# Patient Record
Sex: FEMALE | Race: BLACK OR AFRICAN AMERICAN | Employment: OTHER | URBAN - METROPOLITAN AREA
[De-identification: names, ages, dates, MRNs, and addresses within clinical notes are randomized per-mention and may not be internally consistent; named-entity substitution may affect disease eponyms.]

---

## 2017-02-15 ENCOUNTER — DOCUMENTATION ONLY (OUTPATIENT)
Dept: INTERNAL MEDICINE CLINIC | Age: 62
End: 2017-02-15

## 2017-02-15 NOTE — PROGRESS NOTES
PSR called pts home and cell and left voice messages for pt to call office to schedule f/u appt from pts last ov

## 2017-04-12 ENCOUNTER — OFFICE VISIT (OUTPATIENT)
Dept: CARDIOLOGY CLINIC | Age: 62
End: 2017-04-12

## 2017-04-12 VITALS
BODY MASS INDEX: 26.82 KG/M2 | WEIGHT: 161 LBS | SYSTOLIC BLOOD PRESSURE: 127 MMHG | OXYGEN SATURATION: 97 % | HEART RATE: 80 BPM | HEIGHT: 65 IN | DIASTOLIC BLOOD PRESSURE: 57 MMHG

## 2017-04-12 DIAGNOSIS — M25.471 ANKLE EDEMA, BILATERAL: Primary | ICD-10-CM

## 2017-04-12 DIAGNOSIS — M25.472 ANKLE EDEMA, BILATERAL: Primary | ICD-10-CM

## 2017-04-12 DIAGNOSIS — I10 ESSENTIAL HYPERTENSION: ICD-10-CM

## 2017-04-12 DIAGNOSIS — Z98.890 S/P RADIOFREQUENCY ABLATION OPERATION FOR ARRHYTHMIA: ICD-10-CM

## 2017-04-12 DIAGNOSIS — E78.2 MIXED DYSLIPIDEMIA: ICD-10-CM

## 2017-04-12 DIAGNOSIS — Z86.79 S/P RADIOFREQUENCY ABLATION OPERATION FOR ARRHYTHMIA: ICD-10-CM

## 2017-04-12 DIAGNOSIS — G35 MS (MULTIPLE SCLEROSIS) (HCC): ICD-10-CM

## 2017-04-12 NOTE — PROGRESS NOTES
[facility]   [address] [facility], [address] [place] [address]                                          NEW PATIENT HPI/FOLLOW-UP      NAME:  Maria Eugenia Sutton   :   1955   MRN:   [id number]   PCP:  Elif Leger MD           Subjective: The patient is a 57y.o. year old female  who returns for a routine follow-up after long hiatus for progressive ankle edema over last few months. Seems to occur as day progresses. Denies change in exercise tolerance, chest pain, medication intolerance, palpitations, shortness of breath, PND/orthopnea wheezing, sputum, syncope, dizziness or light headedness. Doing satisfactorily. Review of Systems  General: Pt denies excessive weight gain or loss. Pt is able to conduct ADL's. Respiratory: Denies shortness of breath, GALLARDO, wheezing or stridor.   Cardiovascular: Denies precordial pain, palpitations, +edema or PND  Gastrointestinal: Denies poor appetite, indigestion, abdominal pain or blood in stool  Peripheral vascular: Denies claudication, leg cramps  Neuropsychiatric: Denies paresthesias,tingling,numbness,anxiety,depression,fatigue  Musculoskeletal: Denies pain,tenderness, soreness,swelling      Past Medical History:   Diagnosis Date    Arrhythmia     Essential hypertension     Hypertension     MS (multiple sclerosis) ([address]     Musculoskeletal disorder     Other ill-defined conditions     multiple sclerosis    Unspecified adverse effect of anesthesia     given versed and lasted 24 hours     Patient Active Problem List    Diagnosis Date Noted    Abdominal wall mass of right lower quadrant 2013    HTN (hypertension) 2011    Colon cancer ([address] 2011    MS (multiple sclerosis) ([address] 2011    Fibroids 2011      Past Surgical History:   Procedure Laterality Date    CARDIAC SURG PROCEDURE UNLIST      coronary ablations    COLONOSCOPY  2011         HX COLECTOMY  2006    villous adenoma    HX DILATION AND CURETTAGE  HX HERNIA REPAIR      left    HX HYSTERECTOMY  2011    HX OTHER SURGICAL      tendonitis repair    HX OTHER SURGICAL      cardiac ablation    HX OTHER SURGICAL      bilateral cataracts surgery    HX OTHER SURGICAL      left eye muscle surgery     No Known Allergies   Family History   Problem Relation Age of Onset    Heart Disease Father     Diabetes Sister     Hypertension Sister     Diabetes Mother     Hypertension Mother     Elevated Lipids Mother       Social History     Social History    Marital status: SINGLE     Spouse name: N/A    Number of children: N/A    Years of education: N/A     Occupational History    Not on file. Social History Main Topics    Smoking status: Never Smoker    Smokeless tobacco: Never Used    Alcohol use Yes      Comment: rarely    Drug use: Yes     Special: Prescription, OTC    Sexual activity: Not on file     Other Topics Concern    Not on file     Social History Narrative      Current Outpatient Prescriptions   Medication Sig    amLODIPine (NORVASC) 5 mg tablet TAKE 1 TABLET BY MOUTH DAILY.  Dalfampridine (AMPYRA) 10 mg Tb12 Take 10 mg by mouth two (2) times a day.  cholecalciferol, vitamin D3, (VITAMIN D3) 2,000 unit tab Take  by mouth.  teriflunomide (AUBAGIO) 14 mg tab Take  by mouth daily.  baclofen (LIORESAL) 10 mg tablet Take 10 mg by mouth two (2) times a day.  hydrochlorothiazide (HYDRODIURIL) 25 mg tablet TAKE 1 TABLET EVERY DAY    estradiol (ESTRADIOL TRANSDERMAL PATCH) 0.05 mg/24 hr 1 Patch by TransDERmal route Every Saturday.  prednisoLONE acetate (PRED FORTE) 1 % ophthalmic suspension Administer 1 Drop to both eyes three (3) days a week. Indications: SEVERE OCULAR INFLAMMATION     No current facility-administered medications for this visit. I have reviewed the nurses notes, vitals, problem list, allergy list, medical history, family medical, social history and medications.         Objective:     Physical Exam: Vitals:    04/12/17 1039   BP: 127/57   Pulse: 80   SpO2: 97%   Weight: 161 lb (73 kg)   Height: 5' 5\" (1.651 m)    Body mass index is 26.79 kg/(m^2). General: Well developed, in no acute distress. HEENT: No carotid bruits, no JVD, trach is midline. Heart:  Normal S1/S2 negative S3 or S4. Regular, no murmur, gallop or rub.   Respiratory: Clear bilaterally, no wheezing or rales  Abdomen:   Soft, non-tender, bowel sounds are active.   Extremities:  ++ L>R ankle edema, normal cap refill, no cyanosis. Neuro: A&Ox3, speech clear, gait stable. Skin: Skin color is normal. No rashes or lesions. No diaphoresis. Vascular: 2+ pulses symmetric in all extremities        Data Review:       Cardiographics:    EKG: NSR, incomplete RBBB, LAE,APC's.     Cardiology Labs:    Results for orders placed or performed during the hospital encounter of 01/10/11   EKG, 12 LEAD, INITIAL   Result Value Ref Range    Ventricular Rate 61 BPM    Atrial Rate 61 BPM    P-R Interval 160 ms    QRS Duration 74 ms    Q-T Interval 434 ms    QTC Calculation (Bezet) 436 ms    Calculated P Axis 67 degrees    Calculated R Axis 14 degrees    Calculated T Axis 52 degrees    Diagnosis       Normal sinus rhythm  Possible Left atrial enlargement  When compared with ECG of 08-JUN-2010 15:46,  No significant change was found  Confirmed by Real Time Tomography ([id number]) on 1/11/2011 9:54:50 AM       Lab Results   Component Value Date/Time    Cholesterol, total 223 12/07/2015 09:20 AM    HDL Cholesterol 75 12/07/2015 09:20 AM    LDL, calculated 132 12/07/2015 09:20 AM    Triglyceride 81 12/07/2015 09:20 AM       Lab Results   Component Value Date/Time    Sodium 142 12/07/2015 09:20 AM    Potassium 3.5 12/07/2015 09:20 AM    Chloride 99 12/07/2015 09:20 AM    CO2 30 12/07/2015 09:20 AM    Anion gap 6 01/10/2011 04:35 PM    Glucose 90 12/07/2015 09:20 AM    BUN 15 12/07/2015 09:20 AM    Creatinine 0.75 12/07/2015 09:20 AM    BUN/Creatinine ratio 20 12/07/2015 09:20 AM    GFR est AA 99 12/07/2015 09:20 AM    GFR est non-AA 86 12/07/2015 09:20 AM    Calcium 9.8 12/07/2015 09:20 AM    Bilirubin, total 0.4 03/22/2012 12:00 AM    AST (SGOT) 18 03/22/2012 12:00 AM    Alk. phosphatase 65 03/22/2012 12:00 AM    Protein, total 7.2 03/22/2012 12:00 AM    Albumin 3.7 03/22/2012 12:00 AM    A-G Ratio 1.1 03/22/2012 12:00 AM    ALT (SGPT) 11 03/22/2012 12:00 AM          Assessment:       ICD-10-CM ICD-9-CM    1. Ankle edema, bilateral--L>R M25.471 719.07 AMB POC EKG ROUTINE W/ 12 LEADS, INTER & REP    M25.472     2. Essential hypertension I10 401.9 AMB POC EKG ROUTINE W/ 12 LEADS, INTER & REP   3. MS (multiple sclerosis) (HCC) G35 340    4. S/P radiofrequency ablation operation for arrhythmia--7/16/16  Z98.890 V45.89 AMB POC EKG ROUTINE W/ 12 LEADS, INTER & REP    Z86.79     5. Mixed dyslipidemia E78.2 272.2          Discussion: Patient presents at this time concerned about progressive ankle edema. No evidence of CHF--right sided. Suspect related to amlodipine. Will reduce to 2.5 mg every day--may need to stop and add ARB and continue HCTZ 25 mg every day with option to switch to chlorthalidone. Call with response in 1-2 weeks. F/U in 1 month. Consider echo at f/u. No recurrent palpitations since RFA of arrhythmia. Plan: 1. Continue same meds. Lipid profile and labs followed by PCP. 2.Encouraged to exercise to tolerance and follow low fat, low cholesterol, low sodium predominantly Plant-based (consider Mediterranean) diet. Call with questions or concerns. Will follow up any test results by phone and/or f/u here in office if needed. Jamila Yoo 3.Follow up: 1 month    I have discussed the diagnosis with the patient and the intended plan as seen in the above orders. The patient has received an after-visit summary and questions were answered concerning future plans. I have discussed any concerning medication side effects and warnings with the patient as well.     Storm Sportsdonny, MD  4/12/2017

## 2017-04-12 NOTE — MR AVS SNAPSHOT
Visit Information Date & Time Provider Department Dept. Phone Encounter #  
 4/12/2017 10:30 AM Starlene Paget, MD [facility] at [address] [place] [address] Avenue [address] [place] [address] Upcoming Health Maintenance Date Due Hepatitis C Screening 1955 Pneumococcal 19-64 Highest Risk (1 of 3 - PCV13) 12/4/1973 DTaP/Tdap/Td series (1 - Tdap) 12/4/1975 PAP AKA CERVICAL CYTOLOGY 4/22/2017 BREAST CANCER SCRN MAMMOGRAM 7/19/2017 COLONOSCOPY 4/10/2025 Allergies as of 4/12/2017  Review Complete On: 9/29/2016 By: Mercedez Castellanos MD  
 No Known Allergies Current Immunizations  Never Reviewed Name Date HEP A/HEP B Combined Vaccine 10/24/2011, 4/15/2011 Influenza Vaccine Elane Asya) 9/29/2016 Influenza Vaccine Split 4/15/2011 Not reviewed this visit You Were Diagnosed With   
  
 Codes Comments Essential hypertension    -  Primary ICD-10-CM: I10 
ICD-9-CM: 401.9 Vitals BP Pulse Height(growth percentile) Weight(growth percentile) SpO2 BMI  
 127/57 80 5' 5\" (1.651 m) 161 lb (73 kg) 97% 26.79 kg/m2 OB Status Smoking Status Hysterectomy Never Smoker Vitals History BMI and BSA Data Body Mass Index Body Surface Area  
 26.79 kg/m 2 1.83 m 2 Preferred Pharmacy Pharmacy Name Phone [id number] [place] [facility] [address] [place] [phone] Your Updated Medication List  
  
   
This list is accurate as of: 4/12/17 11:26 AM.  Always use your most recent med list. amLODIPine 5 mg tablet Commonly known as:  Ralf Milly TAKE 1 TABLET BY MOUTH DAILY. AMPYRA 10 mg Tb12 Generic drug:  Dalfampridine Take 10 mg by mouth two (2) times a day. AUBAGIO 14 mg Tab Generic drug:  teriflunomide Take  by mouth daily. baclofen 10 mg tablet Commonly known as:  LIORESAL Take 10 mg by mouth two (2) times a day. ESTRADIOL TRANSDERMAL PATCH 0.05 mg/24 hr  
Generic drug:  estradiol 1 Patch by TransDERmal route Every Saturday. hydroCHLOROthiazide 25 mg tablet Commonly known as:  HYDRODIURIL  
TAKE 1 TABLET EVERY DAY  
  
 PRED FORTE 1 % ophthalmic suspension Generic drug:  prednisoLONE acetate Administer 1 Drop to both eyes three (3) days a week. Indications: SEVERE OCULAR INFLAMMATION  
  
 VITAMIN D3 2,000 unit Tab Generic drug:  cholecalciferol (vitamin D3) Take  by mouth. We Performed the Following AMB POC EKG ROUTINE W/ 12 LEADS, INTER & REP [26975 CPT(R)] Introducing [facility]! [employer] introduces ProPlan patient portal. Now you can access parts of your medical record, email your doctor's office, and request medication refills online. 1. In your internet browser, go to [email or web address] 2. Click on the First Time User? Click Here link in the Sign In box. You will see the New Member Sign Up page. 3. Enter your ProPlan Access Code exactly as it appears below. You will not need to use this code after youve completed the sign-up process. If you do not sign up before the expiration date, you must request a new code. · ProPlan Access Code: [email or web address] Expires: 7/11/2017 11:26 AM 
 
4. Enter the last four digits of your Social Security Number (xxxx) and Date of Birth (mm/dd/yyyy) as indicated and click Submit. You will be taken to the next sign-up page. 5. Create a [employer]t ID. This will be your ProPlan login ID and cannot be changed, so think of one that is secure and easy to remember. 6. Create a ProPlan password. You can change your password at any time. 7. Enter your Password Reset Question and Answer. This can be used at a later time if you forget your password. 8. Enter your e-mail address. You will receive e-mail notification when new information is available in [address] 9. Click Sign Up. You can now view and download portions of your medical record. 10. Click the Download Summary menu link to download a portable copy of your medical information. If you have questions, please visit the Frequently Asked Questions section of the [employer] website. Remember, [employer] is NOT to be used for urgent needs. For medical emergencies, dial 911. Now available from your iPhone and Android! Please provide this summary of care documentation to your next provider. Your primary care clinician is listed as Aissatou AREVALO. If you have any questions after today's visit, please call [phone].

## 2017-04-13 PROBLEM — Z86.79 S/P RADIOFREQUENCY ABLATION OPERATION FOR ARRHYTHMIA: Status: ACTIVE | Noted: 2017-04-13

## 2017-04-13 PROBLEM — Z98.890 S/P RADIOFREQUENCY ABLATION OPERATION FOR ARRHYTHMIA: Status: ACTIVE | Noted: 2017-04-13

## 2017-04-13 PROBLEM — M25.471 ANKLE EDEMA, BILATERAL: Status: ACTIVE | Noted: 2017-04-13

## 2017-04-13 PROBLEM — M25.472 ANKLE EDEMA, BILATERAL: Status: ACTIVE | Noted: 2017-04-13

## 2017-04-13 PROBLEM — E78.2 MIXED DYSLIPIDEMIA: Status: ACTIVE | Noted: 2017-04-13

## 2017-05-05 RX ORDER — HYDROCHLOROTHIAZIDE 25 MG/1
TABLET ORAL
Qty: 90 TAB | Refills: 3 | Status: SHIPPED | OUTPATIENT
Start: 2017-05-05 | End: 2018-02-03

## 2017-10-18 ENCOUNTER — OFFICE VISIT (OUTPATIENT)
Dept: CARDIOLOGY CLINIC | Age: 62
End: 2017-10-18

## 2017-10-18 VITALS
TEMPERATURE: 97.4 F | HEIGHT: 65 IN | SYSTOLIC BLOOD PRESSURE: 124 MMHG | WEIGHT: 157.6 LBS | DIASTOLIC BLOOD PRESSURE: 68 MMHG | BODY MASS INDEX: 26.26 KG/M2 | RESPIRATION RATE: 16 BRPM | OXYGEN SATURATION: 94 % | HEART RATE: 86 BPM

## 2017-10-18 DIAGNOSIS — Z86.79 S/P RADIOFREQUENCY ABLATION OPERATION FOR ARRHYTHMIA: ICD-10-CM

## 2017-10-18 DIAGNOSIS — G35 MS (MULTIPLE SCLEROSIS) (HCC): ICD-10-CM

## 2017-10-18 DIAGNOSIS — M25.471 ANKLE EDEMA, BILATERAL: ICD-10-CM

## 2017-10-18 DIAGNOSIS — C18.9 MALIGNANT NEOPLASM OF COLON, UNSPECIFIED PART OF COLON (HCC): ICD-10-CM

## 2017-10-18 DIAGNOSIS — E78.2 MIXED DYSLIPIDEMIA: ICD-10-CM

## 2017-10-18 DIAGNOSIS — I10 ESSENTIAL HYPERTENSION: Primary | ICD-10-CM

## 2017-10-18 DIAGNOSIS — M25.472 ANKLE EDEMA, BILATERAL: ICD-10-CM

## 2017-10-18 DIAGNOSIS — Z98.890 S/P RADIOFREQUENCY ABLATION OPERATION FOR ARRHYTHMIA: ICD-10-CM

## 2017-10-18 RX ORDER — AMLODIPINE BESYLATE 5 MG/1
2.5 TABLET ORAL DAILY
Qty: 90 TAB | Refills: 3 | Status: SHIPPED | OUTPATIENT
Start: 2017-10-18 | End: 2019-06-17 | Stop reason: SDUPTHER

## 2017-10-18 NOTE — PROGRESS NOTES
[facility]   [address] [facility] [address] [facility] [address]                                          NEW PATIENT HPI/FOLLOW-UP      NAME:  Jose Vaughn   :   1955   MRN:   [id number]   PCP:  Jarvis Stevenson MD           Subjective: The patient is a 57y.o. year old female h/o MS, colon cancer, fibroids, HTN, mixed dyslipidemia, ankle edema (L>R) and s/p RF ablation for arrhythmia who returns for a routine follow-up. Since the last visit, patient reports no new symptoms. Still c/o bilateral ankle swelling. Worsens throughout the day, resolves by morning after resting with feet up. Denies change in exercise tolerance, chest pain, edema, medication intolerance, palpitations, shortness of breath, PND/orthopnea wheezing, sputum, syncope, dizziness or light headedness. Doing satisfactorily. Review of Systems  General: Pt denies excessive weight gain or loss. Pt is able to conduct ADL's. Respiratory: Denies shortness of breath, GALLARDO, wheezing or stridor.   Cardiovascular: +edema Denies precordial pain, palpitations, or PND  Gastrointestinal: Denies poor appetite, indigestion, abdominal pain or blood in stool  Peripheral vascular: Denies claudication, leg cramps  Neuropsychiatric: Denies paresthesias,tingling,numbness,anxiety,depression,fatigue  Musculoskeletal: Denies pain,tenderness, soreness,swelling      Past Medical History:   Diagnosis Date    Arrhythmia     Essential hypertension     Hypertension     MS ([facility]) ([facility]     Musculoskeletal disorder     Other ill-defined conditions(799.89)     multiple sclerosis    Unspecified adverse effect of anesthesia     given versed and lasted 24 hours     Patient Active Problem List    Diagnosis Date Noted    Ankle edema, bilateral--L>R 2017    S/P radiofrequency ablation operation for arrhythmia--2017    Mixed dyslipidemia 2017    Abdominal wall mass of right lower quadrant 2013  HTN (hypertension) 08/29/2011    Colon cancer [address] 08/29/2011    MS (multiple sclerosis) ([facility] 08/29/2011    Fibroids 01/18/2011      Past Surgical History:   Procedure Laterality Date    CARDIAC SURG PROCEDURE UNLIST      coronary ablations    COLONOSCOPY  4/18/2011         HX COLECTOMY  2006    villous adenoma    HX DILATION AND CURETTAGE      HX HERNIA REPAIR      left    HX HYSTERECTOMY  2011    HX OTHER SURGICAL      tendonitis repair    HX OTHER SURGICAL      cardiac ablation    HX OTHER SURGICAL      bilateral cataracts surgery    HX OTHER SURGICAL      left eye muscle surgery     No Known Allergies   Family History   Problem Relation Age of Onset    Heart Disease Father     Diabetes Sister     Hypertension Sister     Diabetes Mother     Hypertension Mother     Elevated Lipids Mother       Social History     Social History    Marital status: SINGLE     Spouse name: N/A    Number of children: N/A    Years of education: N/A     Occupational History    Not on file. Social History Main Topics    Smoking status: Never Smoker    Smokeless tobacco: Never Used    Alcohol use Yes      Comment: rarely    Drug use: Yes     Special: Prescription, OTC    Sexual activity: Not on file     Other Topics Concern    Not on file     Social History Narrative      Current Outpatient Prescriptions   Medication Sig    amLODIPine (NORVASC) 5 mg tablet TAKE 1 TABLET BY MOUTH DAILY.  Dalfampridine (AMPYRA) 10 mg Tb12 Take 10 mg by mouth two (2) times a day.  cholecalciferol, vitamin D3, (VITAMIN D3) 2,000 unit tab Take  by mouth.  baclofen (LIORESAL) 10 mg tablet Take 10 mg by mouth two (2) times a day.  hydrochlorothiazide (HYDRODIURIL) 25 mg tablet TAKE 1 TABLET EVERY DAY    estradiol (ESTRADIOL TRANSDERMAL PATCH) 0.05 mg/24 hr 1 Patch by TransDERmal route Every Saturday.     prednisoLONE acetate (PRED FORTE) 1 % ophthalmic suspension Administer 1 Drop to both eyes three (3) days a week. Indications: SEVERE OCULAR INFLAMMATION    hydroCHLOROthiazide (HYDRODIURIL) 25 mg tablet TAKE 1 TAB BY MOUTH DAILY.  teriflunomide (AUBAGIO) 14 mg tab Take  by mouth daily. No current facility-administered medications for this visit. I have reviewed the nurses notes, vitals, problem list, allergy list, medical history, family medical, social history and medications. Objective:     Physical Exam:     Vitals:    10/18/17 1035   BP: 124/68   Pulse: 86   Resp: 16   Temp: 97.4 °F (36.3 °C)   TempSrc: Oral   SpO2: 94%   Weight: 157 lb 9.6 oz (71.5 kg)   Height: 5' 5\" (1.651 m)    Body mass index is 26.23 kg/(m^2). General: WDWN adult female, in no acute distress. Pleasant affect. HEENT: No carotid bruits, no JVD, trach is midline. Heart:  Normal S1/S2 negative S3 or S4. Regular, no murmur, gallop or rub.   Respiratory: Clear bilaterally, no wheezing or rales  Abdomen:   Soft, non-tender, bowel sounds are active.   Extremities: Very minimal ankle edema L>R, normal cap refill, no cyanosis. Neuro: A&Ox3, speech clear, gait stable. Skin: Skin color is normal. No rashes or lesions. No diaphoresis. Vascular: 2+ pulses symmetric in all extremities        Data Review:       Cardiographics:    EKG: NSR, normal CO, normal axis. Poor R wave progression.     Cardiology Labs:    Results for orders placed or performed during the hospital encounter of 01/10/11   EKG, 12 LEAD, INITIAL   Result Value Ref Range    Ventricular Rate 61 BPM    Atrial Rate 61 BPM    P-R Interval 160 ms    QRS Duration 74 ms    Q-T Interval 434 ms    QTC Calculation (Bezet) 436 ms    Calculated P Axis 67 degrees    Calculated R Axis 14 degrees    Calculated T Axis 52 degrees    Diagnosis       Normal sinus rhythm  Possible Left atrial enlargement  When compared with ECG of 08-JUN-2010 15:46,  No significant change was found  Confirmed by Susie Cadena ([id number]) on 1/11/2011 9:54:50 AM       Lab Results   Component Value Date/Time    Cholesterol, total 223 12/07/2015 09:20 AM    HDL Cholesterol 75 12/07/2015 09:20 AM    LDL, calculated 132 12/07/2015 09:20 AM    Triglyceride 81 12/07/2015 09:20 AM       Lab Results   Component Value Date/Time    Sodium 142 12/07/2015 09:20 AM    Potassium 3.5 12/07/2015 09:20 AM    Chloride 99 12/07/2015 09:20 AM    CO2 30 12/07/2015 09:20 AM    Anion gap 6 01/10/2011 04:35 PM    Glucose 90 12/07/2015 09:20 AM    BUN 15 12/07/2015 09:20 AM    Creatinine 0.75 12/07/2015 09:20 AM    BUN/Creatinine ratio 20 12/07/2015 09:20 AM    GFR est AA 99 12/07/2015 09:20 AM    GFR est non-AA 86 12/07/2015 09:20 AM    Calcium 9.8 12/07/2015 09:20 AM    Bilirubin, total 0.4 03/22/2012 12:00 AM    AST (SGOT) 18 03/22/2012 12:00 AM    Alk. phosphatase 65 03/22/2012 12:00 AM    Protein, total 7.2 03/22/2012 12:00 AM    Albumin 3.7 03/22/2012 12:00 AM    A-G Ratio 1.1 03/22/2012 12:00 AM    ALT (SGPT) 11 03/22/2012 12:00 AM          Assessment:       ICD-10-CM ICD-9-CM    1. Essential hypertension I10 401.9 AMB POC EKG ROUTINE W/ 12 LEADS, INTER & REP   2. Mixed dyslipidemia E78.2 272.2    3. Ankle edema, bilateral--L>R M25.471 719.07     M25.472     4. S/P radiofrequency ablation operation for arrhythmia--7/16/16  Z98.890 V45.89     Z86.79     5. MS (multiple sclerosis) ([facility] G35 340    6. Malignant neoplasm of colon, unspecified part of [facility] ([facility] C18.9 153.9          Discussion: Patient presents at this time stable from a cardiac perspective. Ankle swelling is minimal and worse in left ankle than right. Is back on 5 mg Amlodipine, likely culprit. But BP well controlled. Reduce Amlodipine to 2.5 mg. Advise compression stockings to help with fluid build-up over course of the day. If this worsens, may need to stop amlodipine in favor of ARB. No signs of CHF. Pleased with present status. Plan: 1. Reduce Amlodipine to 2.5 mg daily    -- Add compression stockings for ankle swelling.  Resistant to wearing. 2.Encouraged to exercise to tolerance, lose weight,and follow low fat, low cholesterol, low sodium predominantly Plant-based (consider Mediterranean) diet. 3.Follow up: 12 months    I have discussed the diagnosis with the patient and the intended plan as seen in the above orders. The patient has received an after-visit summary and questions were answered concerning future plans. I have discussed any concerning medication side effects and warnings with the patient as well. Patient seen and examined. All pertinent data reviewed. I have reviewed detailed note as outlined by Kelly Madden. Case discussed with Nursing/medical assistant staff and Kelly Madden. Patient cardiac stable. Trace ankle edema venous exacerbated by Amlodipine. No evidence of cardiac decompensation. Plans as outlined.       Aleksander Biswas PA-C  10/18/2017     Sangeetha Bar MD,FACC

## 2017-10-18 NOTE — MR AVS SNAPSHOT
Visit Information Date & Time Provider Department Dept. Phone Encounter #  
 10/18/2017 10:30 AM Dunia Macdonald MD [facility] at [facility] 1 [id number] Upcoming Health Maintenance Date Due Hepatitis C Screening 1955 Pneumococcal 19-64 Highest Risk (1 of 3 - PCV13) 12/4/1973 DTaP/Tdap/Td series (1 - Tdap) 12/4/1975 PAP AKA CERVICAL CYTOLOGY 4/22/2017 BREAST CANCER SCRN MAMMOGRAM 7/19/2017 INFLUENZA AGE 9 TO ADULT 8/1/2017 COLONOSCOPY 4/10/2025 Allergies as of 10/18/2017  Review Complete On: 03/37/6644 By: Antonio Mckeon RN No Known Allergies Current Immunizations  Never Reviewed Name Date HEP A/HEP B Combined Vaccine 10/24/2011, 4/15/2011 Influenza Vaccine Terrie Goody) 9/29/2016 Influenza Vaccine Split 4/15/2011 Not reviewed this visit You Were Diagnosed With   
  
 Codes Comments Essential hypertension    -  Primary ICD-10-CM: I10 
ICD-9-CM: 401.9 Mixed dyslipidemia     ICD-10-CM: E78.2 ICD-9-CM: 272.2 Ankle edema, bilateral     ICD-10-CM: M25.471, M25.472 ICD-9-CM: 719.07 S/P radiofrequency ablation operation for arrhythmia     ICD-10-CM: Z98.890, Z86.79 
ICD-9-CM: V45.89   
 MS (multiple sclerosis) ([facility]     ICD-10-CM: G35 
ICD-9-CM: 598 Malignant neoplasm of colon, unspecified part of colon ([facility]     ICD-10-CM: C18.9 ICD-9-CM: 153.9 Vitals BP Pulse Temp Resp Height(growth percentile) Weight(growth percentile) 124/68 (BP 1 Location: Right arm, BP Patient Position: Sitting) 86 97.4 °F (36.3 °C) (Oral) 16 5' 5\" (1.651 m) 157 lb 9.6 oz (71.5 kg) SpO2 BMI OB Status Smoking Status 94% 26.23 kg/m2 Hysterectomy Never Smoker BMI and BSA Data Body Mass Index Body Surface Area  
 26.23 kg/m 2 1.81 m 2 Preferred Pharmacy Pharmacy Name Phone [id number] [place], [address] [place] [facility] [phone] Your Updated Medication List  
  
   
This list is accurate as of: 10/18/17 11:10 AM.  Always use your most recent med list. amLODIPine 5 mg tablet Commonly known as:  Miriam Simmonser Take 0.5 Tabs by mouth daily. Indications: hypertension AMPYRA 10 mg Tb12 Generic drug:  Dalfampridine Take 10 mg by mouth two (2) times a day. AUBAGIO 14 mg Tab Generic drug:  teriflunomide Take  by mouth daily. baclofen 10 mg tablet Commonly known as:  LIORESAL Take 10 mg by mouth two (2) times a day. ESTRADIOL TRANSDERMAL PATCH 0.05 mg/24 hr  
Generic drug:  estradiol 1 Patch by TransDERmal route Every Saturday. * hydroCHLOROthiazide 25 mg tablet Commonly known as:  HYDRODIURIL  
TAKE 1 TABLET EVERY DAY  
  
 * hydroCHLOROthiazide 25 mg tablet Commonly known as:  HYDRODIURIL  
TAKE 1 TAB BY MOUTH DAILY. PRED FORTE 1 % ophthalmic suspension Generic drug:  prednisoLONE acetate Administer 1 Drop to both eyes three (3) days a week. Indications: SEVERE OCULAR INFLAMMATION  
  
 VITAMIN D3 2,000 unit Tab Generic drug:  cholecalciferol (vitamin D3) Take  by mouth. * Notice: This list has 2 medication(s) that are the same as other medications prescribed for you. Read the directions carefully, and ask your doctor or other care provider to review them with you. Prescriptions Sent to Pharmacy Refills  
 amLODIPine (NORVASC) 5 mg tablet 3 Sig: Take 0.5 Tabs by mouth daily. Indications: hypertension Class: Normal  
 Pharmacy: [facility]/pharmacy [address] [place] [address] #: [phone] Route: Oral  
  
We Performed the Following AMB POC EKG ROUTINE W/ 12 LEADS, INTER & REP [10865 CPT(R)] Introducing [facility] & HEALTH SERVICES! Dear Mary Jefferson: 
Thank you for requesting a vpod.tv account. Our records indicate that you already have an active [email or web address] account.   You can access your account anytime at [email or web address]. [email or web address]/[email or web address] Guarani Did you know that you can access your hospital and ER discharge instructions at any time in Stackops? You can also review all of your test results from your hospital stay or ER visit. Additional Information If you have questions, please visit the Frequently Asked Questions section of the Stackops website at [email or web address] Remember, Stackops is NOT to be used for urgent needs. For medical emergencies, dial 911. Now available from your iPhone and Android! Please provide this summary of care documentation to your next provider. Your primary care clinician is listed as Sedrick AREVALO. If you have any questions after today's visit, please call [phone].

## 2017-11-17 RX ORDER — AMLODIPINE BESYLATE 5 MG/1
TABLET ORAL
Qty: 90 TAB | Refills: 3 | Status: SHIPPED | OUTPATIENT
Start: 2017-11-17 | End: 2018-02-03

## 2018-02-03 ENCOUNTER — HOSPITAL ENCOUNTER (OUTPATIENT)
Dept: LAB | Age: 63
Discharge: HOME | End: 2018-02-03

## 2018-02-03 ENCOUNTER — HOSPITAL ENCOUNTER (EMERGENCY)
Age: 63
Discharge: HOME | End: 2018-02-03
Attending: FAMILY MEDICINE

## 2018-02-03 VITALS
DIASTOLIC BLOOD PRESSURE: 72 MMHG | BODY MASS INDEX: 26.16 KG/M2 | TEMPERATURE: 98.5 F | WEIGHT: 157 LBS | HEIGHT: 65 IN | RESPIRATION RATE: 18 BRPM | HEART RATE: 77 BPM | OXYGEN SATURATION: 97 % | SYSTOLIC BLOOD PRESSURE: 137 MMHG

## 2018-02-03 DIAGNOSIS — N30.00 ACUTE CYSTITIS WITHOUT HEMATURIA: Primary | ICD-10-CM

## 2018-02-03 LAB
BILIRUB UR QL: NEGATIVE
GLUCOSE UR QL STRIP.AUTO: NEGATIVE MG/DL
KETONES UR-MCNC: NEGATIVE MG/DL
LEUKOCYTE ESTERASE UR QL STRIP: NEGATIVE
NITRITE UR QL: POSITIVE
PH UR: 6 [PH] (ref 5–8)
PROT UR QL: NEGATIVE MG/DL
RBC # UR STRIP: NEGATIVE /UL
SP GR UR: 1.02 (ref 1–1.03)
UROBILINOGEN UR QL: 0.2 EU/DL (ref 0.2–1)

## 2018-02-03 PROCEDURE — 87186 SC STD MICRODIL/AGAR DIL: CPT | Performed by: FAMILY MEDICINE

## 2018-02-03 PROCEDURE — 87077 CULTURE AEROBIC IDENTIFY: CPT | Performed by: FAMILY MEDICINE

## 2018-02-03 PROCEDURE — 87086 URINE CULTURE/COLONY COUNT: CPT | Performed by: FAMILY MEDICINE

## 2018-02-03 RX ORDER — SULFAMETHOXAZOLE AND TRIMETHOPRIM 800; 160 MG/1; MG/1
1 TABLET ORAL 2 TIMES DAILY
Qty: 20 TAB | Refills: 0 | Status: SHIPPED | OUTPATIENT
Start: 2018-02-03 | End: 2018-02-13

## 2018-02-03 NOTE — DISCHARGE INSTRUCTIONS

## 2018-02-03 NOTE — UC PROVIDER NOTE
Patient is a 60 y.o. female presenting with urinary tract infection. The history is provided by the patient. Bladder Infection    This is a new problem. Episode onset: 2 weeks ago. The problem occurs every urination. The problem has not changed since onset. The quality of the pain is described as burning. The pain is mild. There has been no fever. Associated symptoms include frequency and urgency. Pertinent negatives include no chills, no sweats, no nausea, no vomiting, no discharge and no hematuria. Past Medical History:   Diagnosis Date    Arrhythmia     Essential hypertension     Hypertension     MS (multiple sclerosis) ([facility]     Musculoskeletal disorder     Other ill-defined conditions(799.89)     multiple sclerosis    Unspecified adverse effect of anesthesia     given versed and lasted 24 hours        Past Surgical History:   Procedure Laterality Date    CARDIAC SURG PROCEDURE UNLIST      coronary ablations    COLONOSCOPY  4/18/2011         HX COLECTOMY  2006    villous adenoma    HX DILATION AND CURETTAGE      HX HERNIA REPAIR      left    HX HYSTERECTOMY  2011    HX OTHER SURGICAL      tendonitis repair    HX OTHER SURGICAL      cardiac ablation    HX OTHER SURGICAL      bilateral cataracts surgery    HX OTHER SURGICAL      left eye muscle surgery         Family History   Problem Relation Age of Onset    Heart Disease Father     Diabetes Sister     Hypertension Sister     Diabetes Mother     Hypertension Mother     Elevated Lipids Mother         Social History     Social History    Marital status: SINGLE     Spouse name: N/A    Number of children: N/A    Years of education: N/A     Occupational History    Not on file.      Social History Main Topics    Smoking status: Never Smoker    Smokeless tobacco: Never Used    Alcohol use Yes      Comment: rarely    Drug use: Yes     Special: Prescription, OTC    Sexual activity: Not on file     Other Topics Concern    Not on file Social History Narrative                ALLERGIES: Review of patient's allergies indicates no known allergies. Review of Systems   Constitutional: Negative for chills and fever. Respiratory: Negative for shortness of breath and wheezing. Cardiovascular: Negative for chest pain and palpitations. Gastrointestinal: Negative for abdominal pain, nausea and vomiting. Genitourinary: Positive for frequency and urgency. Negative for hematuria. Musculoskeletal: Negative for back pain. Vitals:    02/03/18 1329   BP: 137/72   Pulse: 77   Resp: 18   Temp: 98.5 °F (36.9 °C)   SpO2: 97%   Weight: 71.2 kg (157 lb)   Height: 5' 5\" (1.651 m)       Physical Exam   Constitutional: She appears well-developed and well-nourished. No distress. Cardiovascular: Normal rate, regular rhythm and normal heart sounds. Pulmonary/Chest: Effort normal and breath sounds normal. No respiratory distress. She has no wheezes. She has no rales. Abdominal: Soft. She exhibits no distension. There is no tenderness. There is no rebound, no guarding and no CVA tenderness. Neurological: She is alert. Skin: She is not diaphoretic. Psychiatric: She has a normal mood and affect. Her behavior is normal. Judgment and thought content normal.   Nursing note and vitals reviewed. MDM     Differential Diagnosis; Clinical Impression; Plan:     CLINICAL IMPRESSION:  Acute cystitis without hematuria  (primary encounter diagnosis)    Plan:  1. Bactrim  2. Ucx  3. Fluids  Amount and/or Complexity of Data Reviewed:   Clinical lab tests:  Ordered and reviewed  Risk of Significant Complications, Morbidity, and/or Mortality:   Presenting problems: Moderate  Diagnostic procedures: Moderate  Management options:   Moderate  Progress:   Patient progress:  Stable      Procedures

## 2018-02-05 LAB
BACTERIA SPEC CULT: ABNORMAL
BACTERIA SPEC CULT: ABNORMAL
CC UR VC: ABNORMAL
SERVICE CMNT-IMP: ABNORMAL

## 2018-04-30 RX ORDER — HYDROCHLOROTHIAZIDE 25 MG/1
TABLET ORAL
Qty: 90 TAB | Refills: 3 | Status: SHIPPED | OUTPATIENT
Start: 2018-04-30 | End: 2018-06-07 | Stop reason: SDUPTHER

## 2018-06-07 ENCOUNTER — OFFICE VISIT (OUTPATIENT)
Dept: INTERNAL MEDICINE CLINIC | Age: 63
End: 2018-06-07

## 2018-06-07 VITALS
HEIGHT: 65 IN | OXYGEN SATURATION: 100 % | TEMPERATURE: 97.7 F | HEART RATE: 66 BPM | WEIGHT: 162 LBS | BODY MASS INDEX: 26.99 KG/M2 | SYSTOLIC BLOOD PRESSURE: 127 MMHG | DIASTOLIC BLOOD PRESSURE: 72 MMHG

## 2018-06-07 DIAGNOSIS — R82.90 BAD ODOR OF URINE: ICD-10-CM

## 2018-06-07 DIAGNOSIS — M25.511 ACUTE PAIN OF RIGHT SHOULDER: Primary | ICD-10-CM

## 2018-06-07 LAB
BILIRUB UR QL STRIP: NEGATIVE
GLUCOSE UR-MCNC: NEGATIVE MG/DL
KETONES P FAST UR STRIP-MCNC: NEGATIVE MG/DL
PH UR STRIP: 6 [PH] (ref 4.6–8)
PROT UR QL STRIP: NEGATIVE
SP GR UR STRIP: 1.01 (ref 1–1.03)
UA UROBILINOGEN AMB POC: NORMAL (ref 0.2–1)
URINALYSIS CLARITY POC: NORMAL
URINALYSIS COLOR POC: YELLOW
URINE BLOOD POC: NEGATIVE
URINE LEUKOCYTES POC: NEGATIVE
URINE NITRITES POC: POSITIVE

## 2018-06-07 RX ORDER — DICLOFENAC SODIUM 50 MG/1
50 TABLET, DELAYED RELEASE ORAL
Qty: 30 TAB | Refills: 0 | Status: ON HOLD | OUTPATIENT
Start: 2018-06-07 | End: 2020-06-04

## 2018-06-07 NOTE — PROGRESS NOTES
HISTORY OF PRESENT ILLNESS  Jazmín Vazquez is a 60 y.o. female. HPI   C/o right shoulder pain x 4-6 week  May be related helping her mother at home who has dementia      C/o urine strong odor , no dysuria or increased frequency. Patient Active Problem List    Diagnosis Date Noted    Ankle edema, bilateral--L>R 04/13/2017    S/P radiofrequency ablation operation for arrhythmia--7/16/16 04/13/2017    Mixed dyslipidemia 04/13/2017    Abdominal wall mass of right lower quadrant 01/30/2013    HTN (hypertension) 08/29/2011    Colon cancer ([facility] 08/29/2011    MS (multiple sclerosis) ([facility] 08/29/2011    Fibroids 01/18/2011     Current Outpatient Prescriptions   Medication Sig Dispense Refill    ocrelizumab (OCREVUS IV) by IntraVENous route. Q 6 months      diclofenac EC (VOLTAREN) 50 mg EC tablet Take 1 Tab by mouth two (2) times daily as needed. 30 Tab 0    amLODIPine (NORVASC) 5 mg tablet Take 0.5 Tabs by mouth daily. Indications: hypertension 90 Tab 3    Dalfampridine (AMPYRA) 10 mg Tb12 Take 10 mg by mouth two (2) times a day.  cholecalciferol, vitamin D3, (VITAMIN D3) 2,000 unit tab Take  by mouth.  hydrochlorothiazide (HYDRODIURIL) 25 mg tablet TAKE 1 TABLET EVERY DAY 90 Tab 2    estradiol (ESTRADIOL TRANSDERMAL PATCH) 0.05 mg/24 hr 1 Patch by TransDERmal route Every Saturday.  prednisoLONE acetate (PRED FORTE) 1 % ophthalmic suspension Administer 1 Drop to both eyes three (3) days a week. Indications: SEVERE OCULAR INFLAMMATION      baclofen (LIORESAL) 10 mg tablet Take 10 mg by mouth two (2) times a day.        No Known Allergies   Lab Results  Component Value Date/Time   Hemoglobin A1c 5.6 12/07/2015 09:20 AM   Glucose 90 12/07/2015 09:20 AM   Glucose (POC) 80 01/18/2011 08:35 AM   LDL, calculated 132 (H) 12/07/2015 09:20 AM   Creatinine (POC) 0.8 01/18/2011 08:35 AM   Creatinine 0.75 12/07/2015 09:20 AM      Lab Results  Component Value Date/Time   Cholesterol, total 223 (H) 12/07/2015 09:20 AM   HDL Cholesterol 75 12/07/2015 09:20 AM   LDL, calculated 132 (H) 12/07/2015 09:20 AM   Triglyceride 81 12/07/2015 09:20 AM     Lab Results  Component Value Date/Time   GFR est non-AA 86 12/07/2015 09:20 AM   GFRNA, POC >60 01/18/2011 08:35 AM   GFR est AA 99 12/07/2015 09:20 AM   GFRAA, POC >60 01/18/2011 08:35 AM   Creatinine 0.75 12/07/2015 09:20 AM   Creatinine (POC) 0.8 01/18/2011 08:35 AM   BUN 15 12/07/2015 09:20 AM   BUN (POC) 7 (L) 01/18/2011 08:35 AM   Sodium 142 12/07/2015 09:20 AM   Sodium (POC) 140 01/18/2011 08:35 AM   Potassium 3.5 12/07/2015 09:20 AM   Potassium (POC) 3.8 01/18/2011 08:35 AM   Chloride 99 12/07/2015 09:20 AM   Chloride (POC) 103 01/18/2011 08:35 AM   CO2 30 (H) 12/07/2015 09:20 AM        ROS    Physical Exam   Cardiovascular: Exam reveals no gallop and no friction rub. No murmur heard. Pulmonary/Chest: No respiratory distress. She has no wheezes. She has no rales. She exhibits no tenderness. Musculoskeletal:   Some TTP right posterior shoulder  Pain with abduction and internal ROM       ASSESSMENT and PLAN  Diagnoses and all orders for this visit:    1. Acute pain of right shoulder  -     REFERRAL TO ORTHOPEDICS   Probably need cortisone and PT   Diclofenac rx  2. Bad odor of urine  -     AMB POC URINALYSIS DIP STICK AUTO W/O MICRO-nit positive, leuks neg  -     CULTURE, URINE    Other orders  -     diclofenac EC (VOLTAREN) 50 mg EC tablet; Take 1 Tab by mouth two (2) times daily as needed. Follow-up Disposition:  Return if symptoms worsen or fail to improve.

## 2018-06-07 NOTE — PROGRESS NOTES
Chief Complaint   Patient presents with    Shoulder Pain     right, not accident related  ,onset x 1 month

## 2018-06-07 NOTE — MR AVS SNAPSHOT
[address] [facility] 
[phone] Patient: Cristiane Strange MRN:  [id number] Visit Information Date & Time Provider Department Dept. Phone Encounter #  
 6/7/2018  9:00 AM Desean Diana, [address] [place] [address],[address] [phone] [id number] Follow-up Instructions Return if symptoms worsen or fail to improve. Your Appointments 7/23/2018 10:30 AM  
PHYSICAL with Desean Diaan MD  
[facility]) Appt Note: CPE [facility] 06/01/2018  
 [address] [phone]  
[address] [facility] Upcoming Health Maintenance Date Due Hepatitis C Screening 1955 Pneumococcal 19-64 Highest Risk (1 of 3 - PCV13) 12/4/1973 DTaP/Tdap/Td series (1 - Tdap) 12/4/1975 PAP AKA CERVICAL CYTOLOGY 4/22/2017 BREAST CANCER SCRN MAMMOGRAM 7/19/2017 Influenza Age 5 to Adult 8/1/2018 COLONOSCOPY 4/10/2025 Allergies as of 6/7/2018  Review Complete On: 6/7/2018 By: Radha Bettencourt LPN No Known Allergies Current Immunizations  Never Reviewed Name Date HEP A/HEP B Combined Vaccine 10/24/2011, 4/15/2011 Influenza Vaccine Mace Payor) 9/29/2016 Influenza Vaccine Split 4/15/2011 Not reviewed this visit You Were Diagnosed With   
  
 Codes Comments Acute pain of right shoulder    -  Primary ICD-10-CM: M25.511 ICD-9-CM: 719.41 Bad odor of urine     ICD-10-CM: R82.90 ICD-9-CM: 791.9 Vitals BP Pulse Temp Height(growth percentile) Weight(growth percentile) SpO2  
 127/72 (BP 1 Location: Left arm, BP Patient Position: Sitting) 66 97.7 °F (36.5 °C) (Oral) 5' 5\" (1.651 m) 162 lb (73.5 kg) 100% BMI OB Status Smoking Status 26.96 kg/m2 Hysterectomy Never Smoker BMI and BSA Data Body Mass Index Body Surface Area 26.96 kg/m 2 1.84 m 2 Preferred Pharmacy Pharmacy Name Phone [id number]- [place], [address] Catrina Rangel [phone] Your Updated Medication List  
  
   
This list is accurate as of 6/7/18  9:39 AM.  Always use your most recent med list. amLODIPine 5 mg tablet Commonly known as:  Luis A Cordial Take 0.5 Tabs by mouth daily. Indications: hypertension AMPYRA 10 mg Tb12 Generic drug:  Dalfampridine Take 10 mg by mouth two (2) times a day. baclofen 10 mg tablet Commonly known as:  LIORESAL Take 10 mg by mouth two (2) times a day. diclofenac EC 50 mg EC tablet Commonly known as:  VOLTAREN Take 1 Tab by mouth two (2) times daily as needed. ESTRADIOL TRANSDERMAL PATCH 0.05 mg/24 hr  
Generic drug:  estradiol 1 Patch by TransDERmal route Every Saturday. hydroCHLOROthiazide 25 mg tablet Commonly known as:  HYDRODIURIL  
TAKE 1 TABLET EVERY DAY  
  
 OCREVUS IV  
by IntraVENous route. Q 6 months PRED FORTE 1 % ophthalmic suspension Generic drug:  prednisoLONE acetate Administer 1 Drop to both eyes three (3) days a week. Indications: SEVERE OCULAR INFLAMMATION  
  
 VITAMIN D3 2,000 unit Tab Generic drug:  cholecalciferol (vitamin D3) Take  by mouth. Prescriptions Sent to Pharmacy Refills  
 diclofenac EC (VOLTAREN) 50 mg EC tablet 0 Sig: Take 1 Tab by mouth two (2) times daily as needed. Class: Normal  
 Pharmacy: [facility]/pharmacy [address] [place] [address] Ph #: [phone] Route: Oral  
  
We Performed the Following AMB POC URINALYSIS DIP STICK AUTO W/O MICRO [94882 CPT(R)] CULTURE, URINE [id number] CPT(R)] REFERRAL TO ORTHOPEDICS [AVR614 Custom] Follow-up Instructions Return if symptoms worsen or fail to improve. Referral Information Referral ID Referred By Referred To  
  
 [id number] VALERI AERVALO   
   [id number] [facility] [address] [place], [address] [facility] Phone: [phone] Visits Status Start Date End Date 1 New Request 6/7/18 6/7/19 If your referral has a status of pending review or denied, additional information will be sent to support the outcome of this decision. Introducing [facility] & HEALTH SERVICES! Dear Kieran Snow: 
Thank you for requesting a [facility] account. Our records indicate that you already have an active [facility] account. You can access your account anytime at [email or web address] [email or web address] [email or web address]/[email or web address] Did you know that you can access your hospital and ER discharge instructions at any time in [facility]? You can also review all of your test results from your hospital stay or ER visit. Additional Information If you have questions, please visit the Frequently Asked Questions section of the [facility] website at [email or web address] Remember, [facility] is NOT to be used for urgent needs. For medical emergencies, dial 911. Now available from your iPhone and Android! Please provide this summary of care documentation to your next provider. Your primary care clinician is listed as Alicia AREVALO. If you have any questions after today's visit, please call [phone].

## 2018-06-12 LAB — BACTERIA UR CULT: ABNORMAL

## 2018-06-12 RX ORDER — CEFUROXIME AXETIL 250 MG/1
250 TABLET ORAL 2 TIMES DAILY
Qty: 6 TAB | Refills: 0 | Status: SHIPPED | OUTPATIENT
Start: 2018-06-12 | End: 2018-06-15

## 2018-06-12 RX ORDER — CEFUROXIME AXETIL 250 MG/1
500 TABLET ORAL 2 TIMES DAILY
Qty: 6 TAB | Refills: 0 | Status: SHIPPED | OUTPATIENT
Start: 2018-06-12 | End: 2018-06-12 | Stop reason: SDUPTHER

## 2018-06-13 NOTE — PROGRESS NOTES
Patient has been notified of her recent findings of the urine culture which was positive. Per  patient has a prescription escribed at her pharmacy (Ceftin x 3 days). Patient  Understood and agreed to  the medication.

## 2018-07-23 ENCOUNTER — OFFICE VISIT (OUTPATIENT)
Dept: INTERNAL MEDICINE CLINIC | Age: 63
End: 2018-07-23

## 2018-07-23 VITALS
HEIGHT: 65 IN | BODY MASS INDEX: 27.02 KG/M2 | DIASTOLIC BLOOD PRESSURE: 74 MMHG | SYSTOLIC BLOOD PRESSURE: 125 MMHG | RESPIRATION RATE: 20 BRPM | HEART RATE: 70 BPM | TEMPERATURE: 97.3 F | OXYGEN SATURATION: 98 % | WEIGHT: 162.2 LBS

## 2018-07-23 DIAGNOSIS — R60.0 LEG EDEMA, LEFT: ICD-10-CM

## 2018-07-23 DIAGNOSIS — Z00.00 ROUTINE GENERAL MEDICAL EXAMINATION AT A HEALTH CARE FACILITY: Primary | ICD-10-CM

## 2018-07-23 DIAGNOSIS — I10 ESSENTIAL HYPERTENSION: ICD-10-CM

## 2018-07-23 DIAGNOSIS — G35 MS (MULTIPLE SCLEROSIS) (HCC): ICD-10-CM

## 2018-07-23 RX ORDER — AMLODIPINE BESYLATE 2.5 MG/1
TABLET ORAL DAILY
COMMUNITY
End: 2021-06-29 | Stop reason: SDUPTHER

## 2018-07-23 NOTE — MR AVS SNAPSHOT
[address] [place] [address] 
[phone] Patient: Deniz Sim MRN:  [id number] Visit Information Date & Time Provider Department Dept. Phone Encounter #  
 7/23/2018 10:30 AM Sanam Lucio, [address] [phone] [id number] Follow-up Instructions Return in about 1 year (around 7/23/2019) for cpe. Upcoming Health Maintenance Date Due Hepatitis C Screening 1955 Pneumococcal 19-64 Highest Risk (1 of 3 - PCV13) 12/4/1973 DTaP/Tdap/Td series (1 - Tdap) 12/4/1975 PAP AKA CERVICAL CYTOLOGY 4/22/2017 BREAST CANCER SCRN MAMMOGRAM 7/19/2017 Influenza Age 5 to Adult 8/1/2018 COLONOSCOPY 4/10/2025 Allergies as of 7/23/2018  Review Complete On: 6/7/2018 By: Lalo Turner LPN No Known Allergies Current Immunizations  Never Reviewed Name Date HEP A/HEP B Combined Vaccine 10/24/2011, 4/15/2011 Influenza Vaccine Arletha Rosemarie) 9/29/2016 Influenza Vaccine Split 4/15/2011 Not reviewed this visit You Were Diagnosed With   
  
 Codes Comments Routine general medical examination at a health care facility    -  Primary ICD-10-CM: Z00.00 ICD-9-CM: V70.0 Essential hypertension     ICD-10-CM: I10 
ICD-9-CM: 401.9 Leg edema, left     ICD-10-CM: R60.0 ICD-9-CM: 782.3 MS (multiple sclerosis) ([facility]     ICD-10-CM: G35 
ICD-9-CM: 774 Vitals BP Pulse Temp Resp Height(growth percentile) Weight(growth percentile) 125/74 (BP 1 Location: Right arm, BP Patient Position: Sitting) 70 97.3 °F (36.3 °C) (Oral) 20 5' 5\" (1.651 m) 162 lb 3.2 oz (73.6 kg) SpO2 BMI OB Status Smoking Status 98% 26.99 kg/m2 Hysterectomy Never Smoker BMI and BSA Data Body Mass Index Body Surface Area  
 26.99 kg/m 2 1.84 m 2 Preferred Pharmacy Pharmacy Name Phone [id number]- [address], [address] Horace Corona [phone] Your Updated Medication List  
  
   
This list is accurate as of 7/23/18 11:11 AM.  Always use your most recent med list.  
  
  
  
  
 * NORVASC 2.5 mg tablet Generic drug:  amLODIPine Take  by mouth daily. * amLODIPine 5 mg tablet Commonly known as:  Jazlyn Blade Take 0.5 Tabs by mouth daily. Indications: hypertension AMPYRA 10 mg Tb12 Generic drug:  Dalfampridine Take 10 mg by mouth two (2) times a day. baclofen 10 mg tablet Commonly known as:  LIORESAL Take 10 mg by mouth two (2) times a day. diclofenac EC 50 mg EC tablet Commonly known as:  VOLTAREN Take 1 Tab by mouth two (2) times daily as needed. ESTRADIOL TRANSDERMAL PATCH 0.05 mg/24 hr  
Generic drug:  estradiol 1 Patch by TransDERmal route Every Saturday. hydroCHLOROthiazide 25 mg tablet Commonly known as:  HYDRODIURIL  
TAKE 1 TABLET EVERY DAY  
  
 OCREVUS IV  
by IntraVENous route. Q 6 months PRED FORTE 1 % ophthalmic suspension Generic drug:  prednisoLONE acetate Administer 1 Drop to both eyes three (3) days a week. Indications: SEVERE OCULAR INFLAMMATION  
  
 VITAMIN D3 2,000 unit Tab Generic drug:  cholecalciferol (vitamin D3) Take  by mouth. * Notice: This list has 2 medication(s) that are the same as other medications prescribed for you. Read the directions carefully, and ask your doctor or other care provider to review them with you. We Performed the Following CBC W/O DIFF [26937 CPT(R)] HEPATITIS C AB [42203 CPT(R)] LIPID PANEL [17002 CPT(R)] METABOLIC PANEL, COMPREHENSIVE [53685 CPT(R)] TSH 3RD GENERATION [89766 CPT(R)] URINALYSIS W/ RFLX MICROSCOPIC [13586 CPT(R)] VITAMIN D, 25 HYDROXY [id number] CPT(R)] Follow-up Instructions Return in about 1 year (around 7/23/2019) for cpe. Introducing [facility] & HEALTH SERVICES! Dear Lesvia Mariscal: Thank you for requesting a clinovo account. Our records indicate that you already have an active clinovo account. You can access your account anytime at [email or web address]/Infoblox Did you know that you can access your hospital and ER discharge instructions at any time in clinovo? You can also review all of your test results from your hospital stay or ER visit. Additional Information If you have questions, please visit the Frequently Asked Questions section of the clinovo website at [email or web address] Remember, clinovo is NOT to be used for urgent needs. For medical emergencies, dial 911. Now available from your iPhone and Android! Please provide this summary of care documentation to your next provider. Your primary care clinician is listed as Jae AREVALO. If you have any questions after today's visit, please call [phone].

## 2018-07-23 NOTE — PROGRESS NOTES
HISTORY OF PRESENT ILLNESS  Bala Pedro is a 60 y.o. female. HPI   Pt here for CPE and f/u HTN HLD hx colon cancer 2006 , MS  On infusion ocrelizamab at [facility] Dr Litzy Manning for relapsing MS-every 6 mos  Some leg weakness  2 yrs since last year  Has rolling walker -keeps her Madai Seed  Retired 2011    Saw cardiologist recently and norvasc lowered from 5 to 2.5 mg every day for leg edema-unchanged  Only has leg edema in warm weather months    Had ablation procuedure for ? SVT at [facility] several yrs ago  Patient Active Problem List    Diagnosis Date Noted    Ankle edema, bilateral--L>R 04/13/2017    S/P radiofrequency ablation operation for arrhythmia--7/16/16 04/13/2017    Mixed dyslipidemia 04/13/2017    Abdominal wall mass of right lower quadrant 01/30/2013    HTN (hypertension) 08/29/2011    Colon cancer ([facility] [address] 08/29/2011    MS (multiple sclerosis) ([facility] [address] 08/29/2011    Fibroids 01/18/2011     Current Outpatient Prescriptions   Medication Sig Dispense Refill    ocrelizumab (OCREVUS IV) by IntraVENous route. Q 6 months      diclofenac EC (VOLTAREN) 50 mg EC tablet Take 1 Tab by mouth two (2) times daily as needed. 30 Tab 0    amLODIPine (NORVASC) 5 mg tablet Take 0.5 Tabs by mouth daily. Indications: hypertension 90 Tab 3    Dalfampridine (AMPYRA) 10 mg Tb12 Take 10 mg by mouth two (2) times a day.  cholecalciferol, vitamin D3, (VITAMIN D3) 2,000 unit tab Take  by mouth.  baclofen (LIORESAL) 10 mg tablet Take 10 mg by mouth two (2) times a day.  hydrochlorothiazide (HYDRODIURIL) 25 mg tablet TAKE 1 TABLET EVERY DAY 90 Tab 2    estradiol (ESTRADIOL TRANSDERMAL PATCH) 0.05 mg/24 hr 1 Patch by TransDERmal route Every Saturday.  prednisoLONE acetate (PRED FORTE) 1 % ophthalmic suspension Administer 1 Drop to both eyes three (3) days a week.  Indications: SEVERE OCULAR INFLAMMATION       No Known Allergies  Social History   Substance Use Topics    Smoking status: Never Smoker    Smokeless tobacco: Never Used    Alcohol use Yes      Comment: rarely      Lab Results  Component Value Date/Time   Hemoglobin A1c 5.6 12/07/2015 09:20 AM   Glucose 90 12/07/2015 09:20 AM   Glucose (POC) 80 01/18/2011 08:35 AM   LDL, calculated 132 (H) 12/07/2015 09:20 AM   Creatinine (POC) 0.8 01/18/2011 08:35 AM   Creatinine 0.75 12/07/2015 09:20 AM      Lab Results  Component Value Date/Time   Cholesterol, total 223 (H) 12/07/2015 09:20 AM   HDL Cholesterol 75 12/07/2015 09:20 AM   LDL, calculated 132 (H) 12/07/2015 09:20 AM   Triglyceride 81 12/07/2015 09:20 AM     Lab Results  Component Value Date/Time   GFR est non-AA 86 12/07/2015 09:20 AM   GFRNA, POC >60 01/18/2011 08:35 AM   GFR est AA 99 12/07/2015 09:20 AM   GFRAA, POC >60 01/18/2011 08:35 AM   Creatinine 0.75 12/07/2015 09:20 AM   Creatinine (POC) 0.8 01/18/2011 08:35 AM   BUN 15 12/07/2015 09:20 AM   BUN (POC) 7 (L) 01/18/2011 08:35 AM   Sodium 142 12/07/2015 09:20 AM   Sodium (POC) 140 01/18/2011 08:35 AM   Potassium 3.5 12/07/2015 09:20 AM   Potassium (POC) 3.8 01/18/2011 08:35 AM   Chloride 99 12/07/2015 09:20 AM   Chloride (POC) 103 01/18/2011 08:35 AM   CO2 30 (H) 12/07/2015 09:20 AM        ROS    Physical Exam   Constitutional: She appears well-developed and well-nourished. Appears stated age   Cardiovascular: Normal rate, regular rhythm and normal heart sounds. Exam reveals no gallop and no friction rub. No murmur heard. Pulmonary/Chest: Effort normal and breath sounds normal. No respiratory distress. She has no wheezes. Abdominal: Soft. Bowel sounds are normal.   Musculoskeletal: She exhibits no edema. Neurological: She is alert. Psychiatric: She has a normal mood and affect. Nursing note and vitals reviewed. ASSESSMENT and PLAN  Diagnoses and all orders for this visit:    1.  Routine general medical examination at a health care facility  -     CBC W/O DIFF  -     METABOLIC PANEL, COMPREHENSIVE  -     LIPID PANEL  -     TSH 3RD GENERATION  -     HEPATITIS C AB  -     VITAMIN D, 25 HYDROXY  -     URINALYSIS W/ RFLX MICROSCOPIC   Pt will see gynMD for well woman care   Probably should get prevnar 13 followed by pneumovax 23 since on  for MS----pt will d/w neuorologist   UTD tdap per pt  2. Essential hypertension   Controlled   Can hold norvasc a few weeks to see if left ankle edema resolves and will get BP checked at home  3. Leg edema, left   Advised elevation and support stoackings  4. MS (multiple sclerosis) ([facility] [address]   Per Dr Cari Reyna at [facility]   Has rolling walker    Follow-up Disposition:  Return in about 1 year (around 7/23/2019) for cpe.

## 2018-07-23 NOTE — PROGRESS NOTES
Reviewed record in preparation for visit and have obtained necessary documentation. Identified pt with two pt identifiers(name and ). Chief Complaint   Patient presents with    Complete Physical       Health Maintenance Due   Topic Date Due    Hepatitis C Test  1955    Pneumococcal Vaccine (1 of 3 - PCV13) 1973    DTaP/Tdap/Td  (1 - Tdap) 1975    Cervical Cancer Screening  2017    Breast Cancer Screening  2017       Ms. Jayy Blanco has a reminder for a \"due or due soon\" health maintenance. I have asked that she discuss this further with her primary care provider for follow-up on this health maintenance. Coordination of Care Questionnaire:  :     1) Have you been to an emergency room, urgent care clinic since your last visit? no   Hospitalized since your last visit? no             2) Have you seen or consulted any other health care providers outside of [address] [place] [address] since your last visit? no  (Include any pap smears or colon screenings in this section.)    3) In the event something were to happen to you and you were unable to speak on your behalf, do you have an Advance Directive/ Living Will in place stating your wishes? NO    Do you have an Advance Directive on file? no    4) Are you interested in receiving information on Advance Directives? NO    Patient is accompanied by N/A I have received verbal consent from Torsten Mendez to discuss any/all medical information while they are present in the room.

## 2018-07-24 LAB
25(OH)D3+25(OH)D2 SERPL-MCNC: 33 NG/ML (ref 30–100)
ALBUMIN SERPL-MCNC: 4.2 G/DL (ref 3.6–4.8)
ALBUMIN/GLOB SERPL: 1.6 {RATIO} (ref 1.2–2.2)
ALP SERPL-CCNC: 78 IU/L (ref 39–117)
ALT SERPL-CCNC: 14 IU/L (ref 0–32)
APPEARANCE UR: ABNORMAL
AST SERPL-CCNC: 16 IU/L (ref 0–40)
BACTERIA #/AREA URNS HPF: ABNORMAL /[HPF]
BILIRUB SERPL-MCNC: 0.5 MG/DL (ref 0–1.2)
BILIRUB UR QL STRIP: NEGATIVE
BUN SERPL-MCNC: 14 MG/DL (ref 8–27)
BUN/CREAT SERPL: 18 (ref 12–28)
CALCIUM SERPL-MCNC: 9.8 MG/DL (ref 8.7–10.3)
CASTS URNS QL MICRO: ABNORMAL /LPF
CHLORIDE SERPL-SCNC: 100 MMOL/L (ref 96–106)
CHOLEST SERPL-MCNC: 209 MG/DL (ref 100–199)
CO2 SERPL-SCNC: 28 MMOL/L (ref 20–29)
COLOR UR: YELLOW
CREAT SERPL-MCNC: 0.76 MG/DL (ref 0.57–1)
EPI CELLS #/AREA URNS HPF: >10 /HPF
ERYTHROCYTE [DISTWIDTH] IN BLOOD BY AUTOMATED COUNT: 16.7 % (ref 12.3–15.4)
GLOBULIN SER CALC-MCNC: 2.7 G/DL (ref 1.5–4.5)
GLUCOSE SERPL-MCNC: 86 MG/DL (ref 65–99)
GLUCOSE UR QL: NEGATIVE
HCT VFR BLD AUTO: 39.2 % (ref 34–46.6)
HCV AB S/CO SERPL IA: <0.1 S/CO RATIO (ref 0–0.9)
HDLC SERPL-MCNC: 76 MG/DL
HGB BLD-MCNC: 12.9 G/DL (ref 11.1–15.9)
HGB UR QL STRIP: NEGATIVE
KETONES UR QL STRIP: NEGATIVE
LDLC SERPL CALC-MCNC: 118 MG/DL (ref 0–99)
LEUKOCYTE ESTERASE UR QL STRIP: NEGATIVE
MCH RBC QN AUTO: 25.2 PG (ref 26.6–33)
MCHC RBC AUTO-ENTMCNC: 32.9 G/DL (ref 31.5–35.7)
MCV RBC AUTO: 77 FL (ref 79–97)
MICRO URNS: ABNORMAL
MUCOUS THREADS URNS QL MICRO: PRESENT
NITRITE UR QL STRIP: POSITIVE
PH UR STRIP: 6 [PH] (ref 5–7.5)
PLATELET # BLD AUTO: 302 X10E3/UL (ref 150–379)
POTASSIUM SERPL-SCNC: 4 MMOL/L (ref 3.5–5.2)
PROT SERPL-MCNC: 6.9 G/DL (ref 6–8.5)
PROT UR QL STRIP: NEGATIVE
RBC # BLD AUTO: 5.11 X10E6/UL (ref 3.77–5.28)
RBC #/AREA URNS HPF: ABNORMAL /HPF
SODIUM SERPL-SCNC: 141 MMOL/L (ref 134–144)
SP GR UR: 1.02 (ref 1–1.03)
TRIGL SERPL-MCNC: 75 MG/DL (ref 0–149)
TSH SERPL DL<=0.005 MIU/L-ACNC: 0.82 UIU/ML (ref 0.45–4.5)
UROBILINOGEN UR STRIP-MCNC: 0.2 MG/DL (ref 0.2–1)
VLDLC SERPL CALC-MCNC: 15 MG/DL (ref 5–40)
WBC # BLD AUTO: 7.7 X10E3/UL (ref 3.4–10.8)
WBC #/AREA URNS HPF: ABNORMAL /HPF

## 2018-10-05 ENCOUNTER — OFFICE VISIT (OUTPATIENT)
Dept: INTERNAL MEDICINE CLINIC | Age: 63
End: 2018-10-05

## 2018-10-05 ENCOUNTER — TELEPHONE (OUTPATIENT)
Dept: INTERNAL MEDICINE CLINIC | Age: 63
End: 2018-10-05

## 2018-10-05 VITALS
SYSTOLIC BLOOD PRESSURE: 123 MMHG | HEART RATE: 70 BPM | WEIGHT: 166.6 LBS | DIASTOLIC BLOOD PRESSURE: 78 MMHG | RESPIRATION RATE: 18 BRPM | TEMPERATURE: 97.8 F | OXYGEN SATURATION: 100 % | BODY MASS INDEX: 27.76 KG/M2 | HEIGHT: 65 IN

## 2018-10-05 DIAGNOSIS — M54.42 ACUTE BACK PAIN WITH SCIATICA, LEFT: Primary | ICD-10-CM

## 2018-10-05 DIAGNOSIS — M54.42 ACUTE LEFT-SIDED LOW BACK PAIN WITH LEFT-SIDED SCIATICA: Primary | ICD-10-CM

## 2018-10-05 RX ORDER — DICLOFENAC SODIUM 75 MG/1
75 TABLET, DELAYED RELEASE ORAL 2 TIMES DAILY
Qty: 30 TAB | Refills: 1 | Status: SHIPPED | OUTPATIENT
Start: 2018-10-05 | End: 2019-05-15 | Stop reason: SDUPTHER

## 2018-10-05 NOTE — PROGRESS NOTES
Reviewed record in preparation for visit and have obtained necessary documentation. Identified pt with two pt identifiers(name and ). Chief Complaint Patient presents with  Leg Pain  
  states left leg sharp and achy 10/10 pain  Medication Evaluation Pt fasting Health Maintenance Due Topic Date Due  Pneumococcal Vaccine (1 of 3 - PCV13) 1973  
 DTaP/Tdap/Td  (1 - Tdap) 1975  Shingles Vaccine (1 of 2) 2004  Cervical Cancer Screening  2017  Breast Cancer Screening  2017  Flu Vaccine  2018 Ms. Zuly Ruth has a reminder for a \"due or due soon\" health maintenance. I have asked that she discuss this further with her primary care provider for follow-up on this health maintenance. Coordination of Care Questionnaire: 
:  
 
1) Have you been to an emergency room, urgent care clinic since your last visit? no  
Hospitalized since your last visit? no          
 
2) Have you seen or consulted any other health care providers outside of [address] [place] [address] since your last visit? no  (Include any pap smears or colon screenings in this section.) 3) In the event something were to happen to you and you were unable to speak on your behalf, do you have an Advance Directive/ Living Will in place stating your wishes? NO Do you have an Advance Directive on file? no 
 
4) Are you interested in receiving information on Advance Directives? NO Patient is accompanied by self I have received verbal consent from Salomón Ames to discuss any/all medical information while they are present in the room.

## 2018-10-05 NOTE — PROGRESS NOTES
Sandra Del Valle is a 60 y.o. female patient of Dr. Mark Salas who presents with left leg pain. Starts in the left lower back and radiates to the lateral hip on left and down the left leg to the calf. The pain is 10/10 intensity. Not at rest. The pain is on arising and with walking. 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 The pain started early yesterday. The day prior was working with her mother and had done some lifting. Does not recall any injury at that time. Took ibuprofen 800mg and baclofen. No change in symptoms. \"electric\" and \"sharp\" MRI LS 2011 IMPRESSION:  Degenerative changes at L3-L4 and mild small focal midline disc  
bulge at L4-L5.   
 
Has MS and sees [facility] neurolog[facility], Dr. Alexis Gonsalez.   
 
 
Past Medical History:  
Diagnosis Date  Arrhythmia  Essential hypertension  Hypertension  MS (multiple sclerosis) ([facility] [address]  Musculoskeletal disorder  Other ill-defined conditions(799.89)   
 multiple sclerosis  Unspecified adverse effect of anesthesia   
 given versed and lasted 24 hours Family History Problem Relation Age of Onset  Heart Disease Father  Diabetes Sister  Hypertension Sister  Diabetes Mother  Hypertension Mother  Elevated Lipids Mother Social History Social History  Marital status: SINGLE Spouse name: N/A  
 Number of children: N/A  
 Years of education: N/A Occupational History  Not on file. Social History Main Topics  Smoking status: Never Smoker  Smokeless tobacco: Never Used  Alcohol use Yes Comment: rarely  Drug use: Yes Special: Prescription, OTC  Sexual activity: Yes  
  Partners: Male Other Topics Concern  Not on file Social History Narrative Current Outpatient Prescriptions on File Prior to Visit Medication Sig Dispense Refill  amLODIPine (NORVASC) 2.5 mg tablet Take  by mouth daily.  ocrelizumab (OCREVUS IV) by IntraVENous route. Q 6 months  amLODIPine (NORVASC) 5 mg tablet Take 0.5 Tabs by mouth daily. Indications: hypertension 90 Tab 3  
 Dalfampridine (AMPYRA) 10 mg Tb12 Take 10 mg by mouth two (2) times a day.  cholecalciferol, vitamin D3, (VITAMIN D3) 2,000 unit tab Take  by mouth.  baclofen (LIORESAL) 10 mg tablet Take 10 mg by mouth two (2) times a day.  hydrochlorothiazide (HYDRODIURIL) 25 mg tablet TAKE 1 TABLET EVERY DAY 90 Tab 2  
 estradiol (ESTRADIOL TRANSDERMAL PATCH) 0.05 mg/24 hr 1 Patch by TransDERmal route Every Saturday.  prednisoLONE acetate (PRED FORTE) 1 % ophthalmic suspension Administer 1 Drop to both eyes three (3) days a week. Indications: SEVERE OCULAR INFLAMMATION    
 diclofenac EC (VOLTAREN) 50 mg EC tablet Take 1 Tab by mouth two (2) times daily as needed. 30 Tab 0 No current facility-administered medications on file prior to visit. Review of Systems Pertinent items are noted in HPI. Objective:  
 
Visit Vitals  /78 (BP 1 Location: Left arm, BP Patient Position: Sitting)  Pulse 70  Temp 97.8 °F (36.6 °C) (Oral)  Resp 18  Ht 5' 5\" (1.651 m)  Wt 166 lb 9.6 oz (75.6 kg)  SpO2 100%  BMI 27.72 kg/m2 Gen: well appearing female with walker Resp:  No wheezing, no rhonchi, no rales. CV:  RRR, normal S1S2, no murmur. GI: soft, nontender, without masses. No hepatosplenomegaly. Extrem:  +2 pulses, no edema, warm distally Back- paraspinous muscle pain on palpation, no vertebral pain, able to move on exam table without difficulty Neuro- alert, normal gait, negative SLR, 5/5 motor in lower extremities, normal sensory Assessment/Plan: ICD-10-CM ICD-9-CM 1. Acute left-sided low back pain with left-sided sciatica M54.42 724.2 XR SPINE LUMB 2 OR 3 V  
  724.3 diclofenac EC (VOLTAREN) 75 mg EC tablet  
given stretches. Use heat. Follow-up Disposition: 
Return if symptoms worsen or fail to improve.  
 
Emmett Gupta MD

## 2018-10-05 NOTE — MR AVS SNAPSHOT
[address] [facility] [place] 
[phone] Patient: Trell Granados MRN:  [id number] Visit Information Date & Time Provider Department Dept. Phone Encounter #  
 10/5/2018 10:00 AM David Sanderson, [address] [place] [address],[address] [phone] [id number] Follow-up Instructions Return if symptoms worsen or fail to improve. Your Appointments 10/5/2018 11:15 AM  
XRAY with Zurdo Lin 7373 [facility] Appt Note: xray- Dr. David Sanderson [address] [facility], [address] [facility] [place] Upcoming Health Maintenance Date Due Pneumococcal 19-64 Highest Risk (1 of 3 - PCV13) 12/4/1973 DTaP/Tdap/Td series (1 - Tdap) 12/4/1975 Shingrix Vaccine Age 50> (1 of 2) 12/4/2004 PAP AKA CERVICAL CYTOLOGY 4/22/2017 BREAST CANCER SCRN MAMMOGRAM 7/19/2017 Influenza Age 5 to Adult 8/1/2018 COLONOSCOPY 4/10/2025 Allergies as of 10/5/2018  Review Complete On: 10/5/2018 By: David Sanderson MD  
 No Known Allergies Current Immunizations  Never Reviewed Name Date HEP A/HEP B Combined Vaccine 10/24/2011, 4/15/2011 Influenza Vaccine Yuly Levo) 9/29/2016 Influenza Vaccine Split 4/15/2011 Not reviewed this visit You Were Diagnosed With   
  
 Codes Comments Acute left-sided low back pain with left-sided sciatica    -  Primary ICD-10-CM: M54.42 
ICD-9-CM: 724.2, 724.3 Vitals BP Pulse Temp Resp Height(growth percentile) Weight(growth percentile) 123/78 (BP 1 Location: Left arm, BP Patient Position: Sitting) 70 97.8 °F (36.6 °C) (Oral) 18 5' 5\" (1.651 m) 166 lb 9.6 oz (75.6 kg) SpO2 BMI OB Status Smoking Status 100% 27.72 kg/m2 Hysterectomy Never Smoker BMI and BSA Data Body Mass Index Body Surface Area 27.72 kg/m 2 1.86 m 2 Preferred Pharmacy Pharmacy Name Phone [id number] [place], [address] [place] Edgar [phone] Your Updated Medication List  
  
   
This list is accurate as of 10/5/18 11:08 AM.  Always use your most recent med list.  
  
  
  
  
 * NORVASC 2.5 mg tablet Generic drug:  amLODIPine Take  by mouth daily. * amLODIPine 5 mg tablet Commonly known as:  Connye Squibb Take 0.5 Tabs by mouth daily. Indications: hypertension AMPYRA 10 mg Tb12 Generic drug:  Dalfampridine Take 10 mg by mouth two (2) times a day. baclofen 10 mg tablet Commonly known as:  LIORESAL Take 10 mg by mouth two (2) times a day. * diclofenac EC 50 mg EC tablet Commonly known as:  VOLTAREN Take 1 Tab by mouth two (2) times daily as needed. * diclofenac EC 75 mg EC tablet Commonly known as:  VOLTAREN Take 1 Tab by mouth two (2) times a day. As needed for pain ESTRADIOL TRANSDERMAL PATCH 0.05 mg/24 hr  
Generic drug:  estradiol 1 Patch by TransDERmal route Every Saturday. hydroCHLOROthiazide 25 mg tablet Commonly known as:  HYDRODIURIL  
TAKE 1 TABLET EVERY DAY  
  
 OCREVUS IV  
by IntraVENous route. Q 6 months PRED FORTE 1 % ophthalmic suspension Generic drug:  prednisoLONE acetate Administer 1 Drop to both eyes three (3) days a week. Indications: SEVERE OCULAR INFLAMMATION  
  
 VITAMIN D3 2,000 unit Tab Generic drug:  cholecalciferol (vitamin D3) Take  by mouth. * Notice: This list has 4 medication(s) that are the same as other medications prescribed for you. Read the directions carefully, and ask your doctor or other care provider to review them with you. Prescriptions Sent to Pharmacy Refills  
 diclofenac EC (VOLTAREN) 75 mg EC tablet 1 Sig: Take 1 Tab by mouth two (2) times a day.  As needed for pain  
 Class: Normal  
 Pharmacy: [facility] [address] #: [phone] Route: Oral  
  
Follow-up Instructions Return if symptoms worsen or fail to improve. To-Do List   
 10/05/2018 Imaging:  XR SPINE LUMB 2 OR 3 V Patient Instructions Sacroiliac Joint Pain: Care Instructions Your Care Instructions The sacroiliac joints connect the spine and each side of the pelvis. These joints bear the weight and stress of your torso. This makes them easy to injure. Injury or overuse of these joints may cause low back pain. Stress on these joints can cause joint pain. Sacroiliac joint pain is more common in pregnant women. Certain kinds of arthritis also may cause this type of joint pain. Home treatment may help you feel better. So can avoiding activities that stress your back. Your doctor also may recommend physical therapy. This may include doing exercises and stretches to help with pain. You may also learn to use good posture. Follow-up care is a key part of your treatment and safety. Be sure to make and go to all appointments, and call your doctor if you are having problems. It's also a good idea to know your test results and keep a list of the medicines you take. How can you care for yourself at home? · Ask your doctor about light exercises that may help your back pain. Try to do light activity throughout the day. But make sure to take rests as needed. Find a comfortable position for rest, but don't stay in one position for too long. Avoid activities that cause pain. · To apply heat, put a warm water bottle, a heating pad set on low, or a warm cloth on your back. Do not go to sleep with a heating pad on your skin. · Put ice or a cold pack on your back for 10 to 20 minutes at a time. Put a thin cloth between the ice and your skin. · If the doctor gave you a prescription medicine for pain, take it as prescribed. · If you are not taking a prescription pain medicine, ask your doctor if you can take an over-the-counter pain medicine, such as acetaminophen (Tylenol), ibuprofen (Advil, Motrin), or naproxen (Aleve). Read and follow all instructions on the label. Take pain medicines exactly as directed. · Do not take two or more pain medicines at the same time unless the doctor told you to. Many pain medicines have acetaminophen, which is Tylenol. Too much acetaminophen (Tylenol) can be harmful. · To prevent future back pain, do exercises to stretch and strengthen your back and stomach. Learn how to use good posture, safe lifting techniques, and proper body mechanics. When should you call for help? Call 911 anytime you think you may need emergency care. For example, call if: 
  · You are unable to move a leg at all.  
[facility] your doctor now or seek immediate medical care if: 
  · You have new or worse symptoms in your legs or buttocks. Symptoms may include: ¨ Numbness or tingling. ¨ Weakness. ¨ Pain.  
  · You lose bladder or bowel control.  
 Watch closely for changes in your health, and be sure to contact your doctor if: 
  · You are not getting better as expected. Where can you learn more? Go to http://mar-zeb.info/. Enter L495 in the search box to learn more about \"Sacroiliac Joint Pain: Care Instructions. \" Current as of: November 29, 2017 Content Version: 11.8 © 6206-9687 [employer]. Care instructions adapted under license by [employer] (which disclaims liability or warranty for this information). If you have questions about a medical condition or this instruction, always ask your healthcare professional. [place] [address] any warranty or liability for your use of this information. Sacroiliac Joint Pain: Care Instructions Your Care Instructions The sacroiliac joints connect the spine and each side of the pelvis.  These joints bear the weight and stress of your torso. This makes them easy to injure. Injury or overuse of these joints may cause low back pain. Stress on these joints can cause joint pain. Sacroiliac joint pain is more common in pregnant women. Certain kinds of arthritis also may cause this type of joint pain. Home treatment may help you feel better. So can avoiding activities that stress your back. Your doctor also may recommend physical therapy. This may include doing exercises and stretches to help with pain. You may also learn to use good posture. Follow-up care is a key part of your treatment and safety. Be sure to make and go to all appointments, and call your doctor if you are having problems. It's also a good idea to know your test results and keep a list of the medicines you take. How can you care for yourself at home? · Ask your doctor about light exercises that may help your back pain. Try to do light activity throughout the day. But make sure to take rests as needed. Find a comfortable position for rest, but don't stay in one position for too long. Avoid activities that cause pain. · To apply heat, put a warm water bottle, a heating pad set on low, or a warm cloth on your back. Do not go to sleep with a heating pad on your skin. · Put ice or a cold pack on your back for 10 to 20 minutes at a time. Put a thin cloth between the ice and your skin. · If the doctor gave you a prescription medicine for pain, take it as prescribed. · If you are not taking a prescription pain medicine, ask your doctor if you can take an over-the-counter pain medicine, such as acetaminophen (Tylenol), ibuprofen (Advil, Motrin), or naproxen (Aleve). Read and follow all instructions on the label. Take pain medicines exactly as directed. · Do not take two or more pain medicines at the same time unless the doctor told you to.  Many pain medicines have acetaminophen, which is Tylenol. Too much acetaminophen (Tylenol) can be harmful. · To prevent future back pain, do exercises to stretch and strengthen your back and stomach. Learn how to use good posture, safe lifting techniques, and proper body mechanics. When should you call for help? Call 911 anytime you think you may need emergency care. For example, call if: 
  · You are unable to move a leg at all.  
[facility] your doctor now or seek immediate medical care if: 
  · You have new or worse symptoms in your legs or buttocks. Symptoms may include: ¨ Numbness or tingling. ¨ Weakness. ¨ Pain.  
  · You lose bladder or bowel control.  
 Watch closely for changes in your health, and be sure to contact your doctor if: 
  · You are not getting better as expected. Where can you learn more? Go to http://mar-zeb.info/. Enter N516 in the search box to learn more about \"Sacroiliac Joint Pain: Care Instructions. \" Current as of: November 29, 2017 Content Version: 11.8 © 6705-7884 [employer]. Care instructions adapted under license by [employer] (which disclaims liability or warranty for this information). If you have questions about a medical condition or this instruction, always ask your healthcare professional. [address] any warranty or liability for your use of this information. Sacroiliac Pain: Exercises Your Care Instructions Here are some examples of typical rehabilitation exercises for your condition. Start each exercise slowly. Ease off the exercise if you start to have pain. Your doctor or physical therapist will tell you when you can start these exercises and which ones will work best for you. How to do the exercises Knee-to-chest stretch 1. Do not do the knee-to-chest exercise if it causes or increases back or leg pain. 2. Lie on your back with your knees bent and your feet flat on the floor. You can put a small pillow under your head and neck if it is more comfortable. 3. Grasp your hands under one knee and bring the knee to your chest, keeping the other foot flat on the floor. 4. Keep your lower back pressed to the floor. Hold for at least 15 to 30 seconds. 5. Relax and lower the knee to the starting position. Repeat with the other leg. 6. Repeat 2 to 4 times with each leg. 7. To get more stretch, keep your other leg flat on the floor while pulling your knee to your chest. 
Bridging 1. Lie on your back with both knees bent. Your knees should be bent about 90 degrees. 2. Tighten your belly muscles by pulling in your belly button toward your spine. Then push your feet into the floor, squeeze your buttocks, and lift your hips off the floor until your shoulders, hips, and knees are all in a straight line. 3. Hold for about 6 seconds as you continue to breathe normally, and then slowly lower your hips back down to the floor and rest for up to 10 seconds. 4. Repeat 8 to 12 times. Hip extension 1. Get down on your hands and knees on the floor. 2. Keeping your back and neck straight, lift one leg straight out behind you. When you lift your leg, keep your hips level. Don't let your back twist, and don't let your hip drop toward the floor. 3. Hold for 6 seconds. Repeat 8 to 12 times with each leg. 4. If you feel steady and strong when you do this exercise, you can make it more difficult. To do this, when you lift your leg, also lift the opposite arm straight out in front of you. For example, lift the left leg and the right arm at the same time. (This is sometimes called the \"bird dog exercise. \") Hold for 6 seconds, and repeat 8 to 12 times on each side. Clamshell 1. Lie on your side with a pillow under your head. Keep your feet and knees together and your knees bent. 2. Raise your top knee, but keep your feet together.  Do not let your hips roll back. Your legs should open up like a clamshell. 3. Hold for 6 seconds. 4. Slowly lower your knee back down. Rest for 10 seconds. 5. Repeat 8 to 12 times. 6. Switch to your other side and repeat steps 1 through 5. Hamstring wall stretch 1. Lie on your back in a doorway, with one leg through the open door. 2. Slide your affected leg up the wall to straighten your knee. You should feel a gentle stretch down the back of your leg. 1. Do not arch your back. 2. Do not bend either knee. 3. Keep one heel touching the floor and the other heel touching the wall. Do not point your toes. 3. Hold the stretch for at least 1 minute to begin. Then try to lengthen the time you hold the stretch to as long as 6 minutes. 4. Switch legs, and repeat steps 1 through 3. 
5. Repeat 2 to 4 times. 6. If you do not have a place to do this exercise in a doorway, there is another way to do it: 
7. Lie on your back, and bend one knee. 8. Loop a towel under the ball and toes of that foot, and hold the ends of the towel in your hands. 9. Straighten your knee, and slowly pull back on the towel. You should feel a gentle stretch down the back of your leg. 10. Switch legs, and repeat steps 1 through 3. 
11. Repeat 2 to 4 times. Lower abdominal strengthening 1. Lie on your back with your knees bent and your feet flat on the floor. 2. Tighten your belly muscles by pulling your belly button in toward your spine. 3. Lift one foot off the floor and bring your knee toward your chest, so that your knee is straight above your hip and your leg is bent like the letter \"L. \" 
4. Lift the other knee up to the same position. 5. Lower one leg at a time to the starting position. 6. Keep alternating legs until you have lifted each leg 8 to 12 times. 7. Be sure to keep your belly muscles tight and your back still as you are moving your legs. Be sure to breathe normally. Piriformis stretch 1. Lie on your back with your legs straight. 2. Lift your affected leg, and bend your knee. With your opposite hand, reach across your body, and then gently pull your knee toward your opposite shoulder. 3. Hold the stretch for 15 to 30 seconds. 4. Switch legs and repeat steps 1 through 3. 
5. Repeat 2 to 4 times. Follow-up care is a key part of your treatment and safety. Be sure to make and go to all appointments, and call your doctor if you are having problems. It's also a good idea to know your test results and keep a list of the medicines you take. Where can you learn more? Go to http://mar-zeb.info/. Enter D410 in the search box to learn more about \"Sacroiliac Pain: Exercises. \" Current as of: November 29, 2017 Content Version: 11.8 © 4942-9416 [employer]. Care instructions adapted under license by [employer] (which disclaims liability or warranty for this information). If you have questions about a medical condition or this instruction, always ask your healthcare professional. [place] [address] any warranty or liability for your use of this information. Introducing [facility]! Dear Lisa Rene: 
Thank you for requesting a Hello Curry account. Our records indicate that you already have an active Hello Curry account. You can access your account anytime at [email or web address] Did you know that you can access your hospital and ER discharge instructions at any time in Hello Curry? You can also review all of your test results from your hospital stay or ER visit. Additional Information If you have questions, please visit the Frequently Asked Questions section of the Hello Curry website at [email or web address]/. Remember, Hello Curry is NOT to be used for urgent needs. For medical emergencies, dial 911. Now available from your iPhone and Android! Please provide this summary of care documentation to your next provider. Your primary care clinician is listed as Jenn AREVALO. If you have any questions after today's visit, please call [phone].

## 2018-10-05 NOTE — TELEPHONE ENCOUNTER
Advise patient xray of lumbar spine shows mild degenerative changes. She is to call if her symptoms are not improving by next week with the treatment we discussed today.

## 2018-10-05 NOTE — PATIENT INSTRUCTIONS
Sacroiliac Joint Pain: Care Instructions Your Care Instructions The sacroiliac joints connect the spine and each side of the pelvis. These joints bear the weight and stress of your torso. This makes them easy to injure. Injury or overuse of these joints may cause low back pain. Stress on these joints can cause joint pain. Sacroiliac joint pain is more common in pregnant women. Certain kinds of arthritis also may cause this type of joint pain. Home treatment may help you feel better. So can avoiding activities that stress your back. Your doctor also may recommend physical therapy. This may include doing exercises and stretches to help with pain. You may also learn to use good posture. Follow-up care is a key part of your treatment and safety. Be sure to make and go to all appointments, and call your doctor if you are having problems. It's also a good idea to know your test results and keep a list of the medicines you take. How can you care for yourself at home? · Ask your doctor about light exercises that may help your back pain. Try to do light activity throughout the day. But make sure to take rests as needed. Find a comfortable position for rest, but don't stay in one position for too long. Avoid activities that cause pain. · To apply heat, put a warm water bottle, a heating pad set on low, or a warm cloth on your back. Do not go to sleep with a heating pad on your skin. · Put ice or a cold pack on your back for 10 to 20 minutes at a time. Put a thin cloth between the ice and your skin. · If the doctor gave you a prescription medicine for pain, take it as prescribed. · If you are not taking a prescription pain medicine, ask your doctor if you can take an over-the-counter pain medicine, such as acetaminophen (Tylenol), ibuprofen (Advil, Motrin), or naproxen (Aleve). Read and follow all instructions on the label. Take pain medicines exactly as directed. · Do not take two or more pain medicines at the same time unless the doctor told you to. Many pain medicines have acetaminophen, which is Tylenol. Too much acetaminophen (Tylenol) can be harmful. · To prevent future back pain, do exercises to stretch and strengthen your back and stomach. Learn how to use good posture, safe lifting techniques, and proper body mechanics. When should you call for help? Call 911 anytime you think you may need emergency care. For example, call if: 
  · You are unable to move a leg at all.  
[facility] your doctor now or seek immediate medical care if: 
  · You have new or worse symptoms in your legs or buttocks. Symptoms may include: ¨ Numbness or tingling. ¨ Weakness. ¨ Pain.  
  · You lose bladder or bowel control.  
 Watch closely for changes in your health, and be sure to contact your doctor if: 
  · You are not getting better as expected. Where can you learn more? Go to http://marBlendagramzeb.info/. Enter B018 in the search box to learn more about \"Sacroiliac Joint Pain: Care Instructions. \" Current as of: November 29, 2017 Content Version: 11.8 © 2930-5007 [employer]Vocal. Care instructions adapted under license by [employer] (which disclaims liability or warranty for this information). If you have questions about a medical condition or this instruction, always ask your healthcare professional. [address] any warranty or liability for your use of this information. Sacroiliac Joint Pain: Care Instructions Your Care Instructions The sacroiliac joints connect the spine and each side of the pelvis. These joints bear the weight and stress of your torso. This makes them easy to injure. Injury or overuse of these joints may cause low back pain. Stress on these joints can cause joint pain. Sacroiliac joint pain is more common in pregnant women. Certain kinds of arthritis also may cause this type of joint pain. Home treatment may help you feel better. So can avoiding activities that stress your back. Your doctor also may recommend physical therapy. This may include doing exercises and stretches to help with pain. You may also learn to use good posture. Follow-up care is a key part of your treatment and safety. Be sure to make and go to all appointments, and call your doctor if you are having problems. It's also a good idea to know your test results and keep a list of the medicines you take. How can you care for yourself at home? · Ask your doctor about light exercises that may help your back pain. Try to do light activity throughout the day. But make sure to take rests as needed. Find a comfortable position for rest, but don't stay in one position for too long. Avoid activities that cause pain. · To apply heat, put a warm water bottle, a heating pad set on low, or a warm cloth on your back. Do not go to sleep with a heating pad on your skin. · Put ice or a cold pack on your back for 10 to 20 minutes at a time. Put a thin cloth between the ice and your skin. · If the doctor gave you a prescription medicine for pain, take it as prescribed. · If you are not taking a prescription pain medicine, ask your doctor if you can take an over-the-counter pain medicine, such as acetaminophen (Tylenol), ibuprofen (Advil, Motrin), or naproxen (Aleve). Read and follow all instructions on the label. Take pain medicines exactly as directed. · Do not take two or more pain medicines at the same time unless the doctor told you to. Many pain medicines have acetaminophen, which is Tylenol. Too much acetaminophen (Tylenol) can be harmful. · To prevent future back pain, do exercises to stretch and strengthen your back and stomach. Learn how to use good posture, safe lifting techniques, and proper body mechanics. When should you call for help? Call 911 anytime you think you may need emergency care. For example, call if:   · You are unable to move a leg at all.  
[facility] your doctor now or seek immediate medical care if: 
  · You have new or worse symptoms in your legs or buttocks. Symptoms may include: ¨ Numbness or tingling. ¨ Weakness. ¨ Pain.  
  · You lose bladder or bowel control.  
 Watch closely for changes in your health, and be sure to contact your doctor if: 
  · You are not getting better as expected. Where can you learn more? Go to http://mar-zeb.info/. Enter Y749 in the search box to learn more about \"Sacroiliac Joint Pain: Care Instructions. \" Current as of: November 29, 2017 Content Version: 11.8 © 6642-0287 tripJane. Care instructions adapted under license by [employer] (which disclaims liability or warranty for this information). If you have questions about a medical condition or this instruction, always ask your healthcare professional. [place] [address] any warranty or liability for your use of this information. Sacroiliac Pain: Exercises Your Care Instructions Here are some examples of typical rehabilitation exercises for your condition. Start each exercise slowly. Ease off the exercise if you start to have pain. Your doctor or physical therapist will tell you when you can start these exercises and which ones will work best for you. How to do the exercises Knee-to-chest stretch 1. Do not do the knee-to-chest exercise if it causes or increases back or leg pain. 2. Lie on your back with your knees bent and your feet flat on the floor. You can put a small pillow under your head and neck if it is more comfortable. 3. Grasp your hands under one knee and bring the knee to your chest, keeping the other foot flat on the floor. 4. Keep your lower back pressed to the floor. Hold for at least 15 to 30 seconds. 5. Relax and lower the knee to the starting position. Repeat with the other leg. 6. Repeat 2 to 4 times with each leg. 7. To get more stretch, keep your other leg flat on the floor while pulling your knee to your chest. 
Bridging 1. Lie on your back with both knees bent. Your knees should be bent about 90 degrees. 2. Tighten your belly muscles by pulling in your belly button toward your spine. Then push your feet into the floor, squeeze your buttocks, and lift your hips off the floor until your shoulders, hips, and knees are all in a straight line. 3. Hold for about 6 seconds as you continue to breathe normally, and then slowly lower your hips back down to the floor and rest for up to 10 seconds. 4. Repeat 8 to 12 times. Hip extension 1. Get down on your hands and knees on the floor. 2. Keeping your back and neck straight, lift one leg straight out behind you. When you lift your leg, keep your hips level. Don't let your back twist, and don't let your hip drop toward the floor. 3. Hold for 6 seconds. Repeat 8 to 12 times with each leg. 4. If you feel steady and strong when you do this exercise, you can make it more difficult. To do this, when you lift your leg, also lift the opposite arm straight out in front of you. For example, lift the left leg and the right arm at the same time. (This is sometimes called the \"bird dog exercise. \") Hold for 6 seconds, and repeat 8 to 12 times on each side. Clamshell 1. Lie on your side with a pillow under your head. Keep your feet and knees together and your knees bent. 2. Raise your top knee, but keep your feet together. Do not let your hips roll back. Your legs should open up like a clamshell. 3. Hold for 6 seconds. 4. Slowly lower your knee back down. Rest for 10 seconds. 5. Repeat 8 to 12 times. 6. Switch to your other side and repeat steps 1 through 5. Hamstring wall stretch 1. Lie on your back in a doorway, with one leg through the open door. 2. Slide your affected leg up the wall to straighten your knee. You should feel a gentle stretch down the back of your leg. 1. Do not arch your back. 2. Do not bend either knee. 3. Keep one heel touching the floor and the other heel touching the wall. Do not point your toes. 3. Hold the stretch for at least 1 minute to begin. Then try to lengthen the time you hold the stretch to as long as 6 minutes. 4. Switch legs, and repeat steps 1 through 3. 
5. Repeat 2 to 4 times. 6. If you do not have a place to do this exercise in a doorway, there is another way to do it: 
7. Lie on your back, and bend one knee. 8. Loop a towel under the ball and toes of that foot, and hold the ends of the towel in your hands. 9. Straighten your knee, and slowly pull back on the towel. You should feel a gentle stretch down the back of your leg. 10. Switch legs, and repeat steps 1 through 3. 
11. Repeat 2 to 4 times. Lower abdominal strengthening 1. Lie on your back with your knees bent and your feet flat on the floor. 2. Tighten your belly muscles by pulling your belly button in toward your spine. 3. Lift one foot off the floor and bring your knee toward your chest, so that your knee is straight above your hip and your leg is bent like the letter \"L. \" 
4. Lift the other knee up to the same position. 5. Lower one leg at a time to the starting position. 6. Keep alternating legs until you have lifted each leg 8 to 12 times. 7. Be sure to keep your belly muscles tight and your back still as you are moving your legs. Be sure to breathe normally. Piriformis stretch 1. Lie on your back with your legs straight. 2. Lift your affected leg, and bend your knee. With your opposite hand, reach across your body, and then gently pull your knee toward your opposite shoulder. 3. Hold the stretch for 15 to 30 seconds. 4. Switch legs and repeat steps 1 through 3. 
5. Repeat 2 to 4 times. Follow-up care is a key part of your treatment and safety.  Be sure to make and go to all appointments, and call your doctor if you are having problems. It's also a good idea to know your test results and keep a list of the medicines you take. Where can you learn more? Go to http://mar-zeb.info/. Enter Q632 in the search box to learn more about \"Sacroiliac Pain: Exercises. \" Current as of: November 29, 2017 Content Version: 11.8 © 3735-7860 Semmx. Care instructions adapted under license by [employer] (which disclaims liability or warranty for this information). If you have questions about a medical condition or this instruction, always ask your healthcare professional. [place] [address] any warranty or liability for your use of this information.

## 2018-11-07 ENCOUNTER — OFFICE VISIT (OUTPATIENT)
Dept: INTERNAL MEDICINE CLINIC | Age: 63
End: 2018-11-07

## 2018-11-07 VITALS
DIASTOLIC BLOOD PRESSURE: 78 MMHG | TEMPERATURE: 98 F | RESPIRATION RATE: 18 BRPM | OXYGEN SATURATION: 98 % | SYSTOLIC BLOOD PRESSURE: 144 MMHG | HEART RATE: 66 BPM | HEIGHT: 65 IN | BODY MASS INDEX: 28.09 KG/M2 | WEIGHT: 168.6 LBS

## 2018-11-07 DIAGNOSIS — J40 BRONCHITIS: Primary | ICD-10-CM

## 2018-11-07 RX ORDER — AZITHROMYCIN 250 MG/1
250 TABLET, FILM COATED ORAL SEE ADMIN INSTRUCTIONS
Qty: 6 TAB | Refills: 0 | Status: SHIPPED | OUTPATIENT
Start: 2018-11-07 | End: 2018-11-12

## 2018-11-07 RX ORDER — BENZONATATE 200 MG/1
200 CAPSULE ORAL
Qty: 30 CAP | Refills: 1 | Status: ON HOLD | OUTPATIENT
Start: 2018-11-07 | End: 2020-06-04 | Stop reason: CLARIF

## 2018-11-07 NOTE — PROGRESS NOTES
Reviewed record in preparation for visit and have obtained necessary documentation. Identified pt with two pt identifiers(name and ). Chief Complaint Patient presents with  Cough  
  x 2 weeks  Medication Evaluation Health Maintenance Due Topic Date Due  Pneumococcal Vaccine (1 of 3 - PCV13) 1973  
 DTaP/Tdap/Td  (1 - Tdap) 1975  Shingles Vaccine (1 of 2) 2004  Cervical Cancer Screening  2017  Breast Cancer Screening  2017  Flu Vaccine  2018 Ms. Margarita Salomon has a reminder for a \"due or due soon\" health maintenance. I have asked that she discuss this further with her primary care provider for follow-up on this health maintenance. Coordination of Care Questionnaire: 
:  
 
1) Have you been to an emergency room, urgent care clinic since your last visit? no  
Hospitalized since your last visit? no          
 
2) Have you seen or consulted any other health care providers outside of [address] [place] [address] since your last visit? no  (Include any pap smears or colon screenings in this section.) 3) In the event something were to happen to you and you were unable to speak on your behalf, do you have an Advance Directive/ Living Will in place stating your wishes? YES Do you have an Advance Directive on file? no 
 
4) Are you interested in receiving information on Advance Directives? NO Patient is accompanied by self I have received verbal consent from Ana Luisa Frias to discuss any/all medical information while they are present in the room.

## 2018-11-07 NOTE — PROGRESS NOTES
Aidee Caba is a 60 y.o. female who presents with cough for 2 weeks. Nonproductive. No chest congestion. Nonsmoker. No history of asthma. Denies sinus or ear symptoms. No flu shot this season yet. Past Medical History:  
Diagnosis Date  Arrhythmia  Essential hypertension  Hypertension  MS (multiple sclerosis) ([facility] [address]  Musculoskeletal disorder  Other ill-defined conditions(799.89)   
 multiple sclerosis  Unspecified adverse effect of anesthesia   
 given versed and lasted 24 hours Family History Problem Relation Age of Onset  Heart Disease Father  Diabetes Sister  Hypertension Sister  Diabetes Mother  Hypertension Mother  Elevated Lipids Mother Social History Socioeconomic History  Marital status: SINGLE Spouse name: Not on file  Number of children: Not on file  Years of education: Not on file  Highest education level: Not on file Social Needs  Financial resource strain: Not on file  Food insecurity - worry: Not on file  Food insecurity - inability: Not on file  Transportation needs - medical: Not on file  Transportation needs - non-medical: Not on file Occupational History  Not on file Tobacco Use  Smoking status: Never Smoker  Smokeless tobacco: Never Used Substance and Sexual Activity  Alcohol use: Yes Comment: rarely  Drug use: Yes Types: Prescription, OTC  Sexual activity: Yes  
  Partners: Male Other Topics Concern  Not on file Social History Narrative  Not on file Current Outpatient Medications on File Prior to Visit Medication Sig Dispense Refill  diclofenac EC (VOLTAREN) 75 mg EC tablet Take 1 Tab by mouth two (2) times a day. As needed for pain 30 Tab 1  
 amLODIPine (NORVASC) 2.5 mg tablet Take  by mouth daily.  ocrelizumab (OCREVUS IV) by IntraVENous route. Q 6 months  Dalfampridine (AMPYRA) 10 mg Tb12 Take 10 mg by mouth two (2) times a day.  cholecalciferol, vitamin D3, (VITAMIN D3) 2,000 unit tab Take  by mouth.  hydrochlorothiazide (HYDRODIURIL) 25 mg tablet TAKE 1 TABLET EVERY DAY 90 Tab 2  prednisoLONE acetate (PRED FORTE) 1 % ophthalmic suspension Administer 1 Drop to both eyes three (3) days a week. Indications: SEVERE OCULAR INFLAMMATION    
 diclofenac EC (VOLTAREN) 50 mg EC tablet Take 1 Tab by mouth two (2) times daily as needed. 30 Tab 0  
 amLODIPine (NORVASC) 5 mg tablet Take 0.5 Tabs by mouth daily. Indications: hypertension 90 Tab 3  
 baclofen (LIORESAL) 10 mg tablet Take 10 mg by mouth two (2) times a day.  estradiol (ESTRADIOL TRANSDERMAL PATCH) 0.05 mg/24 hr 1 Patch by TransDERmal route Every Saturday. No current facility-administered medications on file prior to visit. Review of Systems Pertinent items are noted in HPI. Objective:  
 
Visit Vitals /78 (BP 1 Location: Left arm, BP Patient Position: Sitting) Pulse 66 Temp 98 °F (36.7 °C) (Oral) Resp 18 Ht 5' 5\" (1.651 m) Wt 168 lb 9.6 oz (76.5 kg) SpO2 98% BMI 28.06 kg/m² Gen: well appearing female HEENT:   PERRL,normal conjunctiva. External ear and canals normal, TMs no opacification or erythema,  OP no erythema, no exudates, MMM Neck:  Supple. Thyroid normal size, nontender, without nodules. No masses or LAD Resp:  No wheezing, no rhonchi, no rales. CV:  RRR, normal S1S2, no murmur. GI: soft, nontender, without masses. No hepatosplenomegaly. Extrem:  +2 pulses, no edema, warm distally Assessment/Plan: ICD-10-CM ICD-9-CM 1. Bronchitis J40 490 2. BMI 28.0-28.9,adult Z68.28 V85.24 Increase water intake and mucinex plain formula. Hot steamy shower. Humidifier in bedroom. Start antibiotic today. Follow-up Disposition: 
Return if symptoms worsen or fail to improve.  
 
Mike Kay MD 
 
 Discussed the patient's BMI with her. The BMI follow up plan is as follows:  
 
dietary management education, guidance, and counseling 
encourage exercise 
monitor weight 
prescribed dietary intake An After Visit Summary was printed and given to the patient.

## 2018-11-07 NOTE — PATIENT INSTRUCTIONS
Increase water intake and mucinex plain formula. Hot steamy shower. Humidifier in bedroom. Start antibiotic today. Body Mass Index: Care Instructions Your Care Instructions Body mass index (BMI) can help you see if your weight is raising your risk for health problems. It uses a formula to compare how much you weigh with how tall you are. · A BMI lower than 18.5 is considered underweight. · A BMI between 18.5 and 24.9 is considered healthy. · A BMI between 25 and 29.9 is considered overweight. A BMI of 30 or higher is considered obese. If your BMI is in the normal range, it means that you have a lower risk for weight-related health problems. If your BMI is in the overweight or obese range, you may be at increased risk for weight-related health problems, such as high blood pressure, heart disease, stroke, arthritis or joint pain, and diabetes. If your BMI is in the underweight range, you may be at increased risk for health problems such as fatigue, lower protection (immunity) against illness, muscle loss, bone loss, hair loss, and hormone problems. BMI is just one measure of your risk for weight-related health problems. You may be at higher risk for health problems if you are not active, you eat an unhealthy diet, or you drink too much alcohol or use tobacco products. Follow-up care is a key part of your treatment and safety. Be sure to make and go to all appointments, and call your doctor if you are having problems. It's also a good idea to know your test results and keep a list of the medicines you take. How can you care for yourself at home? · Practice healthy eating habits. This includes eating plenty of fruits, vegetables, whole grains, lean protein, and low-fat dairy. · If your doctor recommends it, get more exercise. Walking is a good choice. Bit by bit, increase the amount you walk every day. Try for at least 30 minutes on most days of the week. · Do not smoke. Smoking can increase your risk for health problems. If you need help quitting, talk to your doctor about stop-smoking programs and medicines. These can increase your chances of quitting for good. · Limit alcohol to 2 drinks a day for men and 1 drink a day for women. Too much alcohol can cause health problems. If you have a BMI higher than 25 · Your doctor may do other tests to check your risk for weight-related health problems. This may include measuring the distance around your waist. A waist measurement of more than 40 inches in men or 35 inches in women can increase the risk of weight-related health problems. · Talk with your doctor about steps you can take to stay healthy or improve your health. You may need to make lifestyle changes to lose weight and stay healthy, such as changing your diet and getting regular exercise. If you have a BMI lower than 18.5 · Your doctor may do other tests to check your risk for health problems. · Talk with your doctor about steps you can take to stay healthy or improve your health. You may need to make lifestyle changes to gain or maintain weight and stay healthy, such as getting more healthy foods in your diet and doing exercises to build muscle. Where can you learn more? Go to http://mar-zeb.info/. Enter S176 in the search box to learn more about \"Body Mass Index: Care Instructions. \" Current as of: October 13, 2016 Content Version: 11.4 © 1772-9935 Healthwise, [employer]. Care instructions adapted under license by [employer] (which disclaims liability or warranty for this information). If you have questions about a medical condition or this instruction, always ask your healthcare professional. [place] [address] any warranty or liability for your use of this information.

## 2019-04-23 ENCOUNTER — TELEPHONE (OUTPATIENT)
Dept: INTERNAL MEDICINE CLINIC | Age: 64
End: 2019-04-23

## 2019-04-23 NOTE — TELEPHONE ENCOUNTER
----- Message from Germaine Mendez sent at 4/23/2019  4:04 PM EDT -----  Regarding: Dr. Alexandra Blake/Telephone   Pt questioned if the practice performs ear cleanings, if so she would like to schedule.  Pt's Best Contact Number: [phone]

## 2019-04-25 NOTE — TELEPHONE ENCOUNTER
Called, spoke to pt. Two identifiers confirmed. Pt c/o ear fullness and tenderness. Pt stated her ears may need to be cleaned out. Appointment scheduled for tomorrow at 21 [phone]. Pt verbalized understanding of information discussed w/ no further questions at this time.

## 2019-04-26 ENCOUNTER — CLINICAL SUPPORT (OUTPATIENT)
Dept: INTERNAL MEDICINE CLINIC | Age: 64
End: 2019-04-26

## 2019-04-26 DIAGNOSIS — H61.20 IMPACTED CERUMEN, UNSPECIFIED LATERALITY: Primary | ICD-10-CM

## 2019-04-26 NOTE — PROGRESS NOTES
Pt in today for a nurses visit. Pt c/o excess ear wax and difficulty hearing for several weeks in both ears. Ear flush was successful. Large amount of wax extracted from left ear. Pt satisfied and is now able to hear.

## 2019-05-15 DIAGNOSIS — M54.42 ACUTE LEFT-SIDED LOW BACK PAIN WITH LEFT-SIDED SCIATICA: ICD-10-CM

## 2019-05-16 RX ORDER — DICLOFENAC SODIUM 75 MG/1
75 TABLET, DELAYED RELEASE ORAL 2 TIMES DAILY
Qty: 30 TAB | Refills: 1 | Status: ON HOLD | OUTPATIENT
Start: 2019-05-16 | End: 2020-06-04

## 2019-05-23 RX ORDER — HYDROCHLOROTHIAZIDE 25 MG/1
TABLET ORAL
Qty: 90 TAB | Refills: 3 | Status: SHIPPED | OUTPATIENT
Start: 2019-05-23 | End: 2020-05-22

## 2019-06-17 DIAGNOSIS — M25.472 ANKLE EDEMA, BILATERAL: ICD-10-CM

## 2019-06-17 DIAGNOSIS — I10 ESSENTIAL HYPERTENSION: ICD-10-CM

## 2019-06-17 DIAGNOSIS — M25.471 ANKLE EDEMA, BILATERAL: ICD-10-CM

## 2019-06-18 RX ORDER — AMLODIPINE BESYLATE 5 MG/1
TABLET ORAL
Qty: 90 TAB | Refills: 3 | Status: ON HOLD | OUTPATIENT
Start: 2019-06-18 | End: 2020-06-04 | Stop reason: CLARIF

## 2019-12-02 ENCOUNTER — TELEPHONE (OUTPATIENT)
Dept: INTERNAL MEDICINE CLINIC | Age: 64
End: 2019-12-02

## 2019-12-02 NOTE — TELEPHONE ENCOUNTER
Called, spoke to pt. Two identifiers confirmed. [facility] for [facility]TI d/t lack of availability today. Pt verbalized understanding of information discussed w/ no further questions at this time.

## 2019-12-02 NOTE — TELEPHONE ENCOUNTER
Patient last seen on April 26, 2019 states she needs a call back to be advised what she can do for UTI symptoms. Patient needs to know if appt required or if she can come in for Urine Specimen. Please call.  Thank you

## 2020-03-12 NOTE — PROGRESS NOTES
[facility] @ [address] [place], [place]  Subjective/HPI:     Quirino Camacho is a 71 y.o. female is here for new patient consultation long follow-up previous patient of Dr. Francy Matthew. Patient reports during infusion therapy for MS upon auscultation from the infusion nurse was noted to have some irregularities in her heart rhythm. Patient denies any fluttering palpitations shortness of breath chest pain lightheadedness dizziness or fatigue. She does have a history of cardiac ablation performed by Dr. Vamshi Sood at [facility] greater than 10 years ago, I am unable to locate these records at this time. She denies having any recurrent cardiac issues since her ablation. PCP Provider  Rusty Shah MD  Past Medical History:   Diagnosis Date    Arrhythmia     Essential hypertension     Hypertension     MS (multiple sclerosis) ([address]     Musculoskeletal disorder     Other ill-defined conditions(799.89)     multiple sclerosis    Unspecified adverse effect of anesthesia     given versed and lasted 24 hours      Past Surgical History:   Procedure Laterality Date    CARDIAC SURG PROCEDURE UNLIST      coronary ablations    COLONOSCOPY  4/18/2011         HX COLECTOMY  2006    villous adenoma    HX DILATION AND CURETTAGE      HX HERNIA REPAIR      left    HX HYSTERECTOMY  2011    HX OTHER SURGICAL      tendonitis repair    HX OTHER SURGICAL      cardiac ablation    HX OTHER SURGICAL      bilateral cataracts surgery    HX OTHER SURGICAL      left eye muscle surgery     No Known Allergies   Family History   Problem Relation Age of Onset    Heart Disease Father     Diabetes Sister     Hypertension Sister     Diabetes Mother     Hypertension Mother     Elevated Lipids Mother       Current Outpatient Medications   Medication Sig    amLODIPine (NORVASC) 5 mg tablet TAKE 1 TABLET BY MOUTH DAILY.     hydroCHLOROthiazide (HYDRODIURIL) 25 mg tablet TAKE 1 TAB BY MOUTH DAILY.  diclofenac EC (VOLTAREN) 75 mg EC tablet TAKE 1 TAB BY MOUTH TWO (2) TIMES A DAY. AS NEEDED FOR PAIN    benzonatate (TESSALON) 200 mg capsule Take 1 Cap by mouth three (3) times daily as needed for Cough.  amLODIPine (NORVASC) 2.5 mg tablet Take  by mouth daily.  ocrelizumab (OCREVUS IV) by IntraVENous route. Q 6 months    diclofenac EC (VOLTAREN) 50 mg EC tablet Take 1 Tab by mouth two (2) times daily as needed.  Dalfampridine (AMPYRA) 10 mg Tb12 Take 10 mg by mouth two (2) times a day.  cholecalciferol, vitamin D3, (VITAMIN D3) 2,000 unit tab Take  by mouth.  baclofen (LIORESAL) 10 mg tablet Take 10 mg by mouth two (2) times a day.  estradiol (ESTRADIOL TRANSDERMAL PATCH) 0.05 mg/24 hr 1 Patch by TransDERmal route Every Saturday.  prednisoLONE acetate (PRED FORTE) 1 % ophthalmic suspension Administer 1 Drop to both eyes three (3) days a week. Indications: SEVERE OCULAR INFLAMMATION     No current facility-administered medications for this visit. There were no vitals filed for this visit.   Social History     Socioeconomic History    Marital status: SINGLE     Spouse name: Not on file    Number of children: Not on file    Years of education: Not on file    Highest education level: Not on file   Occupational History    Not on file   Social Needs    Financial resource strain: Not on file    Food insecurity     Worry: Not on file     Inability: Not on file    Transportation needs     Medical: Not on file     Non-medical: Not on file   Tobacco Use    Smoking status: Never Smoker    Smokeless tobacco: Never Used   Substance and Sexual Activity    Alcohol use: Yes     Comment: rarely    Drug use: Yes     Types: Prescription, OTC    Sexual activity: Yes     Partners: Male   Lifestyle    Physical activity     Days per week: Not on file     Minutes per session: Not on file    Stress: Not on file   Relationships    Social connections     Talks on phone: Not on file Gets together: Not on file     Attends Hindu service: Not on file     Active member of club or organization: Not on file     Attends meetings of clubs or organizations: Not on file     Relationship status: Not on file    Intimate partner violence     Fear of current or ex partner: Not on file     Emotionally abused: Not on file     Physically abused: Not on file     Forced sexual activity: Not on file   Other Topics Concern    Not on file   Social History Narrative    Not on file       I have reviewed the nurses notes, vitals, problem list, allergy list, medical history, family, social history and medications. Review of Symptoms:  11 systems reviewed and are negative unless stated in the HPI       Physical Exam:      General: Well developed, in no acute distress, cooperative and alert  HEENT: No carotid bruits, no JVD, trach is midline. Neck Supple, PERRL, EOM intact. Heart:  Normal S1/S2 negative S3 or S4. Irregularly irregular, no murmur, no gallop or rub. Respiratory: Clear bilaterally x 4, no wheezing or rales  Abdomen:   Soft, non-tender, no masses, bowel sounds are active. Extremities:  No edema, normal cap refill, no cyanosis, atraumatic. Neuro: A&Ox3, speech clear, gait stable with use of a cane  Skin: Skin color is normal. No rashes or lesions.  Non diaphoretic  Vascular: 2+ pulses symmetric in all extremities    Cardiographics    ECG: Sinus arrhythmia      Cardiology Labs:  Lab Results   Component Value Date/Time    Cholesterol, total 209 (H) 07/23/2018 11:25 AM    HDL Cholesterol 76 07/23/2018 11:25 AM    LDL, calculated 118 (H) 07/23/2018 11:25 AM    Triglyceride 75 07/23/2018 11:25 AM       Lab Results   Component Value Date/Time    Sodium 141 07/23/2018 11:25 AM    Potassium 4.0 07/23/2018 11:25 AM    Chloride 100 07/23/2018 11:25 AM    CO2 28 07/23/2018 11:25 AM    Anion gap 6 01/10/2011 04:35 PM    Glucose 86 07/23/2018 11:25 AM    BUN 14 07/23/2018 11:25 AM    Creatinine 0.76 07/23/2018 11:25 AM    BUN/Creatinine ratio 18 07/23/2018 11:25 AM    GFR est AA 97 07/23/2018 11:25 AM    GFR est non-AA 84 07/23/2018 11:25 AM    Calcium 9.8 07/23/2018 11:25 AM    Bilirubin, total 0.5 07/23/2018 11:25 AM    AST (SGOT) 16 07/23/2018 11:25 AM    Alk. phosphatase 78 07/23/2018 11:25 AM    Protein, total 6.9 07/23/2018 11:25 AM    Albumin 4.2 07/23/2018 11:25 AM    A-G Ratio 1.6 07/23/2018 11:25 AM    ALT (SGPT) 14 07/23/2018 11:25 AM        No results found for: CPK, CK, CKMMB, CKMB, RCK3, CKMBT, CKMBPOC, CKNDX, CKND1, ZULY, TROPT, TROIQ, RENUKA, TROPT, TNIPOC, BNP, BNPP, BNPNT    Lab Results   Component Value Date/Time    Hemoglobin A1c 5.6 12/07/2015 09:20 AM      Assessment:     Assessment:     Diagnoses and all orders for this visit:    1. Essential hypertension    2. MS (multiple sclerosis) ([facility]    3. Mixed dyslipidemia    4. S/P radiofrequency ablation operation for arrhythmia--7/16/16         ICD-10-CM ICD-9-CM    1. Essential hypertension I10 401.9    2. MS (multiple sclerosis) ([facility] G35 340    3. Mixed dyslipidemia E78.2 272.2    4. S/P radiofrequency ablation operation for arrhythmia--7/16/16  Z98.890 V45.89     Z86.79       No orders of the defined types were placed in this encounter. Plan:     1. Hypertension: Controlled 132/80 continue current medication  2. Hyperlipidemia: 2018  no history of heart disease. 3.  History of RFA ablation: Greater than 10 years ago unknown type. Presenting with sinus arrhythmia. Will evaluate with echocardiogram and 5-day Holter monitor. Patient has underlying sinus arrhythmia, pattern dependent on her inspiratory expiratory cycle. Given history of RFA will proceed with echo and Holter. If no abnormalities no further follow-up is needed. Alesha Kramer NP      Please note that this dictation was completed with [employer], the computer voice recognition software.   Quite often unanticipated grammatical, syntax, homophones, and other interpretive errors are inadvertently transcribed by the computer software. Please disregard these errors. Please excuse any errors that have escaped final proofreading. Thank you.

## 2020-03-13 ENCOUNTER — OFFICE VISIT (OUTPATIENT)
Dept: CARDIOLOGY CLINIC | Age: 65
End: 2020-03-13

## 2020-03-13 VITALS
WEIGHT: 172.4 LBS | HEART RATE: 60 BPM | OXYGEN SATURATION: 98 % | BODY MASS INDEX: 28.72 KG/M2 | RESPIRATION RATE: 18 BRPM | SYSTOLIC BLOOD PRESSURE: 132 MMHG | HEIGHT: 65 IN | DIASTOLIC BLOOD PRESSURE: 80 MMHG

## 2020-03-13 DIAGNOSIS — I10 ESSENTIAL HYPERTENSION: ICD-10-CM

## 2020-03-13 DIAGNOSIS — E78.2 MIXED DYSLIPIDEMIA: ICD-10-CM

## 2020-03-13 DIAGNOSIS — G35 MS (MULTIPLE SCLEROSIS) (HCC): ICD-10-CM

## 2020-03-13 DIAGNOSIS — I49.8 SINUS ARRHYTHMIA: Primary | ICD-10-CM

## 2020-03-13 DIAGNOSIS — Z98.890 S/P RADIOFREQUENCY ABLATION OPERATION FOR ARRHYTHMIA: ICD-10-CM

## 2020-03-13 DIAGNOSIS — Z86.79 S/P RADIOFREQUENCY ABLATION OPERATION FOR ARRHYTHMIA: ICD-10-CM

## 2020-03-13 NOTE — PROGRESS NOTES
Chief Complaint   Patient presents with    New Patient    Irregular Heart Beat     during infusion nurse noticed irregular heart beat     1. Have you been to the ER, urgent care clinic since your last visit? Hospitalized since your last visit? NO    2. Have you seen or consulted any other health care providers outside of the [address] [place] [address] since your last visit? Include any pap smears or colon screening.  No

## 2020-05-22 RX ORDER — HYDROCHLOROTHIAZIDE 25 MG/1
TABLET ORAL
Qty: 90 TAB | Refills: 3 | Status: SHIPPED | OUTPATIENT
Start: 2020-05-22 | End: 2021-03-26

## 2020-05-28 ENCOUNTER — HOSPITAL ENCOUNTER (OUTPATIENT)
Dept: NON INVASIVE DIAGNOSTICS | Age: 65
Discharge: HOME | End: 2020-05-28
Payer: MEDICARE

## 2020-05-28 DIAGNOSIS — G35 MS (MULTIPLE SCLEROSIS) (HCC): ICD-10-CM

## 2020-05-28 DIAGNOSIS — I10 ESSENTIAL HYPERTENSION: ICD-10-CM

## 2020-05-28 LAB
ECHO AO ROOT DIAM: 2.32 CM
ECHO AV AREA PLAN: 2.6 CM2
ECHO EST RA PRESSURE: 5 MMHG
ECHO LA AREA 4C: 15.7 CM2
ECHO LA MAJOR AXIS: 2.36 CM
ECHO LA TO AORTIC ROOT RATIO: 1.02
ECHO LA VOL 4C: 35.07 ML (ref 22–52)
ECHO LV EDV A4C: 78.9 ML
ECHO LV EDV TEICHHOLZ: 0.38 ML
ECHO LV EJECTION FRACTION A4C: 82 %
ECHO LV ESV A4C: 14.4 ML
ECHO LV ESV TEICHHOLZ: 0.16 ML
ECHO LV INTERNAL DIMENSION DIASTOLIC: 3.81 CM (ref 3.9–5.3)
ECHO LV INTERNAL DIMENSION SYSTOLIC: 2.7 CM
ECHO LV IVSD: 0.93 CM (ref 0.6–0.9)
ECHO LV MASS 2D: 138.7 G (ref 67–162)
ECHO LV POSTERIOR WALL DIASTOLIC: 1.13 CM (ref 0.6–0.9)
ECHO LVOT DIAM: 1.8 CM
ECHO LVOT PEAK GRADIENT: 2.8 MMHG
ECHO LVOT PEAK VELOCITY: 83.19 CM/S
ECHO MV A VELOCITY: 38.38 CM/S
ECHO MV AREA PHT: 5.3 CM2
ECHO MV AREA PLAN: 4.6 CM2
ECHO MV E DECELERATION TIME (DT): 97.2 MS
ECHO MV E VELOCITY: 32.99 CM/S
ECHO MV E/A RATIO: 0.86
ECHO MV MAX VELOCITY: 49.34 CM/S
ECHO MV MEAN GRADIENT: 0.3 MMHG
ECHO MV MEAN INFLOW VELOCITY: 0.24 M/S
ECHO MV PEAK GRADIENT: 1 MMHG
ECHO MV PRESSURE HALF TIME (PHT): 41.6 MS
ECHO MV VTI: 11.71 CM
ECHO PULMONARY ARTERY SYSTOLIC PRESSURE (PASP): 28 MMHG
ECHO PV MAX VELOCITY: 68.09 CM/S
ECHO PV PEAK GRADIENT: 1.9 MMHG
ECHO PV REGURGITANT MAX VELOCITY: 97.66 CM/S
ECHO RA AREA 4C: 14.27 CM2
ECHO RIGHT VENTRICULAR SYSTOLIC PRESSURE (RVSP): 28 MMHG
ECHO TV REGURGITANT MAX VELOCITY: 239.69 CM/S
ECHO TV REGURGITANT PEAK GRADIENT: 23 MMHG
LVFS 2D: 29.05 %
LVSV (MOD SINGLE 4C): 34.11 ML
LVSV (TEICH): 18.59 ML
MV DEC SLOPE: 3.4

## 2020-05-28 PROCEDURE — 93225 XTRNL ECG REC<48 HRS REC: CPT

## 2020-05-28 PROCEDURE — 93306 TTE W/DOPPLER COMPLETE: CPT

## 2020-05-28 NOTE — PROGRESS NOTES
5 day epatch monitor placed after explanation   Monitor [id number]  To be removed Tuesday June 2nd and return to registration by 3pm noting to pt if she had and life threatening issues call 911.

## 2020-05-29 NOTE — PROGRESS NOTES
Nhan: Please call patient let her know echocardiogram looks good, I will await the report from event monitor.

## 2020-05-29 NOTE — PROGRESS NOTES
Spoke with patient  Verified patient with 2 patient identifiers  Informed per Salinas Krueger NP echocardiogram looks good, still await the report from event monitor will call once results received. Patient verbalized understanding.

## 2020-06-01 ENCOUNTER — OFFICE VISIT (OUTPATIENT)
Dept: URGENT CARE | Age: 65
End: 2020-06-01

## 2020-06-01 VITALS — HEART RATE: 88 BPM | TEMPERATURE: 98.3 F | OXYGEN SATURATION: 98 % | RESPIRATION RATE: 20 BRPM

## 2020-06-01 DIAGNOSIS — Z20.822 ENCOUNTER FOR LABORATORY TESTING FOR COVID-19 VIRUS: Primary | ICD-10-CM

## 2020-06-03 LAB — SARS-COV-2, NAA: NOT DETECTED

## 2020-06-04 ENCOUNTER — ANESTHESIA EVENT (OUTPATIENT)
Dept: ENDOSCOPY | Age: 65
End: 2020-06-04
Payer: MEDICARE

## 2020-06-04 ENCOUNTER — ANESTHESIA (OUTPATIENT)
Dept: ENDOSCOPY | Age: 65
End: 2020-06-04
Payer: MEDICARE

## 2020-06-04 ENCOUNTER — HOSPITAL ENCOUNTER (OUTPATIENT)
Age: 65
Setting detail: OUTPATIENT SURGERY
Discharge: HOME | End: 2020-06-04
Attending: INTERNAL MEDICINE | Admitting: INTERNAL MEDICINE
Payer: MEDICARE

## 2020-06-04 VITALS
OXYGEN SATURATION: 100 % | WEIGHT: 167 LBS | HEART RATE: 61 BPM | DIASTOLIC BLOOD PRESSURE: 67 MMHG | BODY MASS INDEX: 27.82 KG/M2 | HEIGHT: 65 IN | RESPIRATION RATE: 13 BRPM | SYSTOLIC BLOOD PRESSURE: 135 MMHG | TEMPERATURE: 97.3 F

## 2020-06-04 PROCEDURE — 74011250636 HC RX REV CODE- 250/636: Performed by: NURSE ANESTHETIST, CERTIFIED REGISTERED

## 2020-06-04 PROCEDURE — 76060000031 HC ANESTHESIA FIRST 0.5 HR: Performed by: INTERNAL MEDICINE

## 2020-06-04 PROCEDURE — 74011250636 HC RX REV CODE- 250/636: Performed by: INTERNAL MEDICINE

## 2020-06-04 PROCEDURE — 74011000250 HC RX REV CODE- 250: Performed by: NURSE ANESTHETIST, CERTIFIED REGISTERED

## 2020-06-04 PROCEDURE — 76040000019: Performed by: INTERNAL MEDICINE

## 2020-06-04 RX ORDER — FENTANYL CITRATE 50 UG/ML
25 INJECTION, SOLUTION INTRAMUSCULAR; INTRAVENOUS
Status: CANCELLED | OUTPATIENT
Start: 2020-06-04 | End: 2020-06-04

## 2020-06-04 RX ORDER — DEXTROMETHORPHAN/PSEUDOEPHED 2.5-7.5/.8
1.2 DROPS ORAL
Status: CANCELLED | OUTPATIENT
Start: 2020-06-04

## 2020-06-04 RX ORDER — ATROPINE SULFATE 0.1 MG/ML
0.5 INJECTION INTRAVENOUS
Status: CANCELLED | OUTPATIENT
Start: 2020-06-04 | End: 2020-06-05

## 2020-06-04 RX ORDER — SODIUM CHLORIDE 9 MG/ML
25 INJECTION, SOLUTION INTRAVENOUS CONTINUOUS
Status: DISCONTINUED | OUTPATIENT
Start: 2020-06-04 | End: 2020-06-04 | Stop reason: HOSPADM

## 2020-06-04 RX ORDER — NALOXONE HYDROCHLORIDE 0.4 MG/ML
0.4 INJECTION, SOLUTION INTRAMUSCULAR; INTRAVENOUS; SUBCUTANEOUS
Status: CANCELLED | OUTPATIENT
Start: 2020-06-04 | End: 2020-06-04

## 2020-06-04 RX ORDER — EPINEPHRINE 0.1 MG/ML
1 INJECTION INTRACARDIAC; INTRAVENOUS
Status: CANCELLED | OUTPATIENT
Start: 2020-06-04 | End: 2020-06-05

## 2020-06-04 RX ORDER — LIDOCAINE HYDROCHLORIDE 20 MG/ML
INJECTION, SOLUTION EPIDURAL; INFILTRATION; INTRACAUDAL; PERINEURAL AS NEEDED
Status: DISCONTINUED | OUTPATIENT
Start: 2020-06-04 | End: 2020-06-04 | Stop reason: HOSPADM

## 2020-06-04 RX ORDER — FLUMAZENIL 0.1 MG/ML
0.2 INJECTION INTRAVENOUS
Status: CANCELLED | OUTPATIENT
Start: 2020-06-04 | End: 2020-06-04

## 2020-06-04 RX ORDER — SODIUM CHLORIDE 0.9 % (FLUSH) 0.9 %
5-40 SYRINGE (ML) INJECTION EVERY 8 HOURS
Status: CANCELLED | OUTPATIENT
Start: 2020-06-04

## 2020-06-04 RX ORDER — SODIUM CHLORIDE 0.9 % (FLUSH) 0.9 %
5-40 SYRINGE (ML) INJECTION AS NEEDED
Status: CANCELLED | OUTPATIENT
Start: 2020-06-04

## 2020-06-04 RX ORDER — MIDAZOLAM HYDROCHLORIDE 1 MG/ML
.25-5 INJECTION, SOLUTION INTRAMUSCULAR; INTRAVENOUS
Status: CANCELLED | OUTPATIENT
Start: 2020-06-04 | End: 2020-06-04

## 2020-06-04 RX ORDER — SODIUM CHLORIDE 9 MG/ML
INJECTION, SOLUTION INTRAVENOUS
Status: DISCONTINUED | OUTPATIENT
Start: 2020-06-04 | End: 2020-06-04 | Stop reason: HOSPADM

## 2020-06-04 RX ORDER — SODIUM CHLORIDE 9 MG/ML
75 INJECTION, SOLUTION INTRAVENOUS CONTINUOUS
Status: CANCELLED | OUTPATIENT
Start: 2020-06-04 | End: 2020-06-04

## 2020-06-04 RX ORDER — PROPOFOL 10 MG/ML
INJECTION, EMULSION INTRAVENOUS AS NEEDED
Status: DISCONTINUED | OUTPATIENT
Start: 2020-06-04 | End: 2020-06-04 | Stop reason: HOSPADM

## 2020-06-04 RX ADMIN — SODIUM CHLORIDE 25 ML/HR: 900 INJECTION, SOLUTION INTRAVENOUS at 09:23

## 2020-06-04 RX ADMIN — LIDOCAINE HYDROCHLORIDE 80 MG: 20 INJECTION, SOLUTION EPIDURAL; INFILTRATION; INTRACAUDAL; PERINEURAL at 09:35

## 2020-06-04 RX ADMIN — PROPOFOL 50 MG: 10 INJECTION, EMULSION INTRAVENOUS at 09:37

## 2020-06-04 RX ADMIN — PROPOFOL 50 MG: 10 INJECTION, EMULSION INTRAVENOUS at 09:35

## 2020-06-04 RX ADMIN — PROPOFOL 50 MG: 10 INJECTION, EMULSION INTRAVENOUS at 09:40

## 2020-06-04 RX ADMIN — SODIUM CHLORIDE: 900 INJECTION, SOLUTION INTRAVENOUS at 09:27

## 2020-06-04 NOTE — PROGRESS NOTES
[place]  1955  [id number]    Situation:  Verbal report received from: Kristin Conklin rn  Procedure: Procedure(s):  COLONOSCOPY    Background:    Preoperative diagnosis: hx polyps  Postoperative diagnosis: Normal colon    :  Dr. Tramaine Kerns  Assistant(s): Endoscopy Technician-1: Kyung Canavan  Endoscopy RN-1: Lyndon Lin RN    Specimens: * No specimens in log *  H. Pylori  no    Assessment:  Intra-procedure medications   Anesthesia gave intra-procedure sedation and medications, see anesthesia flow sheet yes    Intravenous fluids: NS@ KVO     Vital signs stable yes    Abdominal assessment: round and soft yes    Recommendation:  Discharge patient per MD Latia Easton  Return to [place]  Family or Mark Chuy, sister  Permission to share finding with family or friend yes

## 2020-06-04 NOTE — PERIOP NOTES
Endoscope was pre-cleaned at the bedside immediately following procedure by Kike Kaplan    Anesthesia reports 150mg Propofol, 80mg Lidocaine and 200mL NS given during procedure. Received report from anesthesia staff on vital signs and status of patient. Delfino Davenport

## 2020-06-04 NOTE — H&P
Gastroenterology Outpatient History and Physical    Patient: Lili Greene    Physician: Wilbur Vasquez MD    Chief Complaint: [place] hx colon CA  History of Present Illness: 71yo F with hx colon CA in fully removed polyp.   Last colonoscopy 4/1015 was normal    History:  Past Medical History:   Diagnosis Date    Arrhythmia     Essential hypertension     Hypertension     MS (multiple sclerosis) (HCC)     Musculoskeletal disorder     Other ill-defined conditions(799.89)     multiple sclerosis    Unspecified adverse effect of anesthesia     given versed and lasted 24 hours      Past Surgical History:   Procedure Laterality Date    CARDIAC SURG PROCEDURE UNLIST      coronary ablations    COLONOSCOPY  4/18/2011         HX COLECTOMY  2006    villous adenoma    HX DILATION AND CURETTAGE      HX HERNIA REPAIR      left    HX HYSTERECTOMY  2011    HX OTHER SURGICAL      tendonitis repair    HX OTHER SURGICAL      cardiac ablation    HX OTHER SURGICAL      bilateral cataracts surgery    HX OTHER SURGICAL      left eye muscle surgery      Social History     Socioeconomic History    Marital status: SINGLE     Spouse name: Not on file    Number of children: Not on file    Years of education: Not on file    Highest education level: Not on file   Tobacco Use    Smoking status: Never Smoker    Smokeless tobacco: Never Used   Substance and Sexual Activity    Alcohol use: Yes     Comment: rarely    Drug use: Yes     Types: Prescription, OTC    Sexual activity: Yes     Partners: Male      Family History   Problem Relation Age of Onset    Heart Disease Father     Diabetes Sister     Hypertension Sister     Diabetes Mother     Hypertension Mother     Elevated Lipids Mother       Patient Active Problem List   Diagnosis Code    Fibroids D21.9    HTN (hypertension) I10    Colon cancer ([address] C18.9    MS (multiple sclerosis) ([facility] [address] [facility] G35    Abdominal wall mass of right lower quadrant R19.03    Ankle edema, bilateral--L>R M25.471, M25.472    S/P radiofrequency ablation operation for arrhythmia--7/16/16  Z98.890, Z86.79    Mixed dyslipidemia E78.2    BMI 28.0-28.9,adult Z68.28       Allergies: No Known Allergies  Medications:   Prior to Admission medications    Medication Sig Start Date End Date Taking? Authorizing Provider   hydroCHLOROthiazide (HYDRODIURIL) 25 mg tablet TAKE 1 TAB BY MOUTH DAILY. 5/22/20  Yes Ever Kyle MD   amLODIPine (NORVASC) 2.5 mg tablet Take  by mouth daily. Yes Provider, Historical   Dalfampridine (AMPYRA) 10 mg Tb12 Take 10 mg by mouth two (2) times a day. Yes Provider, Historical   cholecalciferol, vitamin D3, (VITAMIN D3) 2,000 unit tab Take  by mouth. Yes Provider, Historical   estradiol (ESTRADIOL TRANSDERMAL PATCH) 0.05 mg/24 hr 1 Patch by TransDERmal route Every Saturday. Yes Provider, Historical   prednisoLONE acetate (PRED FORTE) 1 % ophthalmic suspension Administer 1 Drop to both eyes three (3) days a week. Indications: SEVERE OCULAR INFLAMMATION 1/19/11  Yes Provider, Historical   ocrelizumab (OCREVUS IV) by IntraVENous route. Q 6 months    Provider, Historical     Physical Exam:   Vital Signs: Blood pressure 148/68, pulse 70, temperature 98 °F (36.7 °C), resp. rate 14, height 5' 5\" (1.651 m), weight 75.8 kg (167 lb), SpO2 99 %, not currently breastfeeding.   General: well developed, well nourished   HEENT: unremarkable   Heart: regular rhythm no mumur    Lungs: clear   Abdominal:  benign   Neurological: unremarkable   Extremities: no edema     Findings/Diagnosis: Pers hx colon CA  Plan of Care/Planned Procedure: Colonoscopy with conscious/deep sedation    Signed:  Ceci Hinkle MD 6/4/2020

## 2020-06-04 NOTE — PROGRESS NOTES
Nhan: Please call patient monitor finds no arrhythmias. She has some intermittent premature heartbeats. Unless highly symptomatic would not add additional medications at this time.

## 2020-06-04 NOTE — ANESTHESIA PREPROCEDURE EVALUATION
Relevant Problems   No relevant active problems       Anesthetic History     Other anesthesia complications     Comments: VERSED lasted 24 hours per pt     Review of Systems / Medical History  Patient summary reviewed, nursing notes reviewed and pertinent labs reviewed    Pulmonary  Within defined limits                 Neuro/Psych             Comments: MS Cardiovascular    Hypertension        Dysrhythmias (pt unsure of rhythm but reports she had an ablation at [facility] [place] [place] that was successful)       Exercise tolerance: <4 METS  Comments: 5- ECHO: 60-65% EF, mild TR    3- EKG: SR with rate variation      Uses walker to ambulate   GI/Hepatic/Renal               Comments: Hx of polyps Endo/Other             Other Findings   Comments: MS           Physical Exam    Airway  Mallampati: II  TM Distance: 4 - 6 cm  Neck ROM: normal range of motion   Mouth opening: Normal     Cardiovascular  Regular rate and rhythm,  S1 and S2 normal,  no murmur, click, rub, or gallop             Dental  No notable dental hx       Pulmonary  Breath sounds clear to auscultation               Abdominal  GI exam deferred       Other Findings            Anesthetic Plan    ASA: 2  Anesthesia type: total IV anesthesia          Induction: Intravenous  Anesthetic plan and risks discussed with: Patient      Hrt meds not due until 11 am

## 2020-06-04 NOTE — DISCHARGE INSTRUCTIONS
[facility]  [id number]  1955    COLON DISCHARGE INSTRUCTIONS  Discomfort:  Redness at IV site- apply warm compress to area; if redness or soreness persist- contact your physician  There may be a slight amount of blood passed from the rectum  Gaseous discomfort- walking, belching will help relieve any discomfort  You may not operate a vehicle for 12 hours  You may not engage in an occupation involving machinery or appliances for rest of today  You may not drink alcoholic beverages for at least 12 hours  Avoid making any critical decisions for at least 24 hour  DIET:   High fiber diet. - however -  remember your colon is empty and a heavy meal will produce gas. Avoid these foods:  vegetables, fried / greasy foods, carbonated drinks for today  MEDICATION:         ACTIVITY:  You may not resume your normal daily activities until tomorrow AM; it is recommended that you spend the remainder of the day resting -  avoid any strenuous activity. CALL M.D.   ANY SIGN OF:   Increasing pain, nausea, vomiting  Abdominal distension (swelling)  New increased bleeding (oral or rectal)  Fever (chills)  Pain in chest area  Bloody discharge from nose or mouth  Shortness of breath    IMPRESSION:  -Anatomy consistent with right hemicolectomy  -Normal ileal mucosa with normal appearing ileocolonic anastomosis  -Normal colonic mucosa    Follow-up Instructions:   Call Dr. Nathan Hodges if questions arise regarding your procedure  Telephone # [phone]  Repeat colonoscopy in 5 years    Camille Conklin MD

## 2020-06-04 NOTE — ANESTHESIA POSTPROCEDURE EVALUATION
Procedure(s):  COLONOSCOPY.    total IV anesthesia    Anesthesia Post Evaluation        Patient location during evaluation: PACU  Note status: Adequate. Level of consciousness: responsive to verbal stimuli and sleepy but conscious  Pain management: satisfactory to patient  Airway patency: patent  Anesthetic complications: no  Cardiovascular status: acceptable  Respiratory status: acceptable  Hydration status: acceptable  Comments: +Post-Anesthesia Evaluation and Assessment    Patient: Baltazar Fofana MRN: [id number]  SSN: [id number]   YOB: 1955  Age: 71 y.o. Sex: female      Cardiovascular Function/Vital Signs    /67   Pulse 61   Temp 36.3 °C (97.3 °F)   Resp 13   Ht 5' 5\" (1.651 m)   Wt 75.8 kg (167 lb)   SpO2 100%   Breastfeeding No   BMI 27.79 kg/m²     Patient is status post Procedure(s):  COLONOSCOPY. Nausea/Vomiting: Controlled. Postoperative hydration reviewed and adequate. Pain:  Pain Scale 1: Numeric (0 - 10) (06/04/20 1016)  Pain Intensity 1: 0 (06/04/20 1016)   Managed. Neurological Status: At baseline. Mental Status and Level of Consciousness: Arousable. Pulmonary Status:   O2 Device: Room air (06/04/20 1016)   Adequate oxygenation and airway patent. Complications related to anesthesia: None    Post-anesthesia assessment completed. No concerns. Signed By: Pastor Neville DO    6/4/2020  Post anesthesia nausea and vomiting:  controlled      INITIAL Post-op Vital signs:   Vitals Value Taken Time   /62 6/4/2020 10:18 AM   Temp 36.3 °C (97.3 °F) 6/4/2020  9:57 AM   Pulse 64 6/4/2020 10:20 AM   Resp 14 6/4/2020 10:20 AM   SpO2 96 % 6/4/2020 10:20 AM   Vitals shown include unvalidated device data.

## 2020-06-04 NOTE — PROCEDURES
NAME:  Vivian Goss   :   1955   MRN:   [id number]     Date/Time:  2020 9:47 AM    Colonoscopy Operative Report    Procedure Type:   Colonoscopy --screening     Indications:     Personal history of colon cancer (screening only)  Pre-operative Diagnosis: see indication above  Post-operative Diagnosis:  See findings below  :  Arik Sim MD  Referring Provider: Misbah Suarez MD    Exam:  Airway: clear, no airway problems anticipated  Heart: RRR, without gallops or rubs  Lungs: clear bilaterally without wheezes, crackles, or rhonchi  Abdomen: soft, nontender, nondistended, bowel sounds present  Mental Status: awake, alert and oriented to person, place and time    Sedation:  MAC anesthesia Propofol 150mg IV  Procedure Details:  After informed consent was obtained with all risks and benefits of procedure explained and preoperative exam completed, the patient was taken to the endoscopy suite and placed in the left lateral decubitus position. Upon sequential sedation as per above, a digital rectal exam was performed demonstrating no hemorrhoids. The Olympus videocolonoscope  was inserted in the rectum and carefully advanced to the terminal ileum. The quality of preparation was adequate. The colonoscope was slowly withdrawn with careful evaluation between folds. Retroflexion in the rectum was completed demonstrating no hemorrhoids. Findings:     -Anatomy consistent with right hemicolectomy  -Normal ileal mucosa with normal appearing ileocolonic anastomosis  -Normal colonic mucosa    Specimen Removed:  None. Complications: None. EBL:  None. Impression:    -Anatomy consistent with right hemicolectomy  -Normal ileal mucosa with normal appearing ileocolonic anastomosis  -Normal colonic mucosa    Recommendations: --Repeat colonoscopy in 5 years. High fiber diet. Resume normal medication(s). Discharge Disposition:  Home in the company of a  when able to ambulate.     Pj David Mitchell MD

## 2020-06-05 NOTE — PROGRESS NOTES
Spoke with patient  Verified patient with 2 patient identifiers  Informed per Mary Carmen Grimaldo NP monitor finds no arrhythmias ,there were some intermittent premature heartbeats. Unless highly symptomatic do not recommend adding additional medications at this time. Patient verbalized understanding.

## 2020-11-24 ENCOUNTER — OFFICE VISIT (OUTPATIENT)
Dept: URGENT CARE | Age: 65
End: 2020-11-24
Payer: MEDICARE

## 2020-11-24 VITALS — TEMPERATURE: 98.6 F | RESPIRATION RATE: 18 BRPM | HEART RATE: 68 BPM | OXYGEN SATURATION: 96 %

## 2020-11-24 DIAGNOSIS — Z20.822 EXPOSURE TO COVID-19 VIRUS: Primary | ICD-10-CM

## 2020-11-24 PROCEDURE — G8400 PT W/DXA NO RESULTS DOC: HCPCS | Performed by: FAMILY MEDICINE

## 2020-11-24 PROCEDURE — G9711 PT HX TOT COL OR COLON CA: HCPCS | Performed by: FAMILY MEDICINE

## 2020-11-24 PROCEDURE — 1101F PT FALLS ASSESS-DOCD LE1/YR: CPT | Performed by: FAMILY MEDICINE

## 2020-11-24 PROCEDURE — G8432 DEP SCR NOT DOC, RNG: HCPCS | Performed by: FAMILY MEDICINE

## 2020-11-24 PROCEDURE — G8427 DOCREV CUR MEDS BY ELIG CLIN: HCPCS | Performed by: FAMILY MEDICINE

## 2020-11-24 PROCEDURE — G8756 NO BP MEASURE DOC: HCPCS | Performed by: FAMILY MEDICINE

## 2020-11-24 PROCEDURE — G8419 CALC BMI OUT NRM PARAM NOF/U: HCPCS | Performed by: FAMILY MEDICINE

## 2020-11-24 PROCEDURE — 99213 OFFICE O/P EST LOW 20 MIN: CPT | Performed by: FAMILY MEDICINE

## 2020-11-24 PROCEDURE — 1090F PRES/ABSN URINE INCON ASSESS: CPT | Performed by: FAMILY MEDICINE

## 2020-11-24 PROCEDURE — G8536 NO DOC ELDER MAL SCRN: HCPCS | Performed by: FAMILY MEDICINE

## 2020-11-24 NOTE — PROGRESS NOTES
This patient was seen in [facility] at [address] [place] [address] [facility]gent Care while in their vehicle due to COVID-19 pandemic with PPE and focused examination in order to decrease community viral transmission. The patient/guardian gave verbal consent to treat. Shiva Johns is a 71 y.o. female who presents for COVID-19 testing. Was exposed to COVID-19 by relative on 11/19/2020. Denies cough, fever, SOB. The history is provided by the patient.         Past Medical History:   Diagnosis Date    Arrhythmia     Essential hypertension     Hypertension     MS (multiple sclerosis) ([facility] [address]     Musculoskeletal disorder     Other ill-defined conditions(799.89)     multiple sclerosis    Unspecified adverse effect of anesthesia     given versed and lasted 24 hours        Past Surgical History:   Procedure Laterality Date    CARDIAC SURG PROCEDURE UNLIST      coronary ablations    COLONOSCOPY  4/18/2011         COLONOSCOPY N/A 6/4/2020    COLONOSCOPY performed by [facility]arte [facility]    COLONOSCOPY,DIAGNOSTIC  6/4/2020         HX COLECTOMY  2006    villous adenoma    HX DILATION AND CURETTAGE      HX HERNIA REPAIR      left    HX HYSTERECTOMY  2011    HX OTHER SURGICAL      tendonitis repair    HX OTHER SURGICAL      cardiac ablation    HX OTHER SURGICAL      bilateral cataracts surgery    HX OTHER SURGICAL      left eye muscle surgery         Family History   Problem Relation Age of Onset    Heart Disease Father     Diabetes Sister     Hypertension Sister     Diabetes Mother     Hypertension Mother     Elevated Lipids Mother         Social History     Socioeconomic History    Marital status: SINGLE     Spouse name: Not on file    Number of children: Not on file    Years of education: Not on file    Highest education level: Not on file   Occupational History    Not on file   Social Needs    Financial resource strain: Not on file    Food insecurity     Worry: Not on file Inability: Not on file    Transportation needs     Medical: Not on file     Non-medical: Not on file   Tobacco Use    Smoking status: Never Smoker    Smokeless tobacco: Never Used   Substance and Sexual Activity    Alcohol use: Yes     Comment: rarely    Drug use: Yes     Types: Prescription, OTC    Sexual activity: Yes     Partners: Male   Lifestyle    Physical activity     Days per week: Not on file     Minutes per session: Not on file    Stress: Not on file   Relationships    Social connections     Talks on phone: Not on file     Gets together: Not on file     Attends [employer] service: Not on file     Active member of club or organization: Not on file     Attends meetings of clubs or organizations: Not on file     Relationship status: Not on file    Intimate partner violence     Fear of current or ex partner: Not on file     Emotionally abused: Not on file     Physically abused: Not on file     Forced sexual activity: Not on file   Other Topics Concern    Not on file   Social History Narrative    Not on file                ALLERGIES: Patient has no known allergies. Review of Systems   Constitutional: Negative for activity change, appetite change, chills and fever. HENT: Negative for congestion, rhinorrhea and sore throat. Respiratory: Negative for cough, shortness of breath and wheezing. Cardiovascular: Negative for chest pain. Gastrointestinal: Negative for abdominal pain, diarrhea, nausea and vomiting. Musculoskeletal: Negative for myalgias. Neurological: Negative for headaches. Vitals:    11/24/20 1328   Pulse: 68   Resp: 18   Temp: 98.6 °F (37 °C)   SpO2: 96%       Physical Exam  Vitals signs and nursing note reviewed. Constitutional:       General: She is not in acute distress. Appearance: She is well-developed. She is not diaphoretic. Pulmonary:      Effort: Pulmonary effort is normal. No respiratory distress. Breath sounds: Normal breath sounds. No stridor. No wheezing, rhonchi or rales. Neurological:      Mental Status: She is alert. Psychiatric:         Behavior: Behavior normal.         Thought Content: Thought content normal.         Judgment: Judgment normal.         MDM    ICD-10-CM ICD-9-CM   1. Exposure to COVID-19 virus  Z20.828 V01.79       Orders Placed This Encounter    NOVEL CORONAVIRUS (COVID-19)     Scheduling Instructions:      1) Due to current limited availability of the COVID-19 PCR test, tests will be prioritized and may not be completed.              2) Order only if the test result will change clinical management or necessary for a return to mission-critical employment decision.              3) Print and instruct patient to adhere to CDC home isolation program. (Link Above)              4) Set up or refer patient for a monitoring program.              5) Have patient sign up for and leverage [employer] (if not previously done). Order Specific Question:   Is this test for diagnosis or screening? Answer:   Screening     Order Specific Question:   Symptomatic for COVID-19 as defined by CDC? Answer:   No     Order Specific Question:   Hospitalized for COVID-19? Answer:   No     Order Specific Question:   Admitted to ICU for COVID-19? Answer:   No     Order Specific Question:   Employed in healthcare setting? Answer:   No     Order Specific Question:   Resident in a congregate (group) care setting? Answer:   No     Order Specific Question:   Pregnant? Answer:   No     Order Specific Question:   Previously tested for COVID-19? Answer:   Yes        Quarantine  Deep breathing exercises, ambulation    If signs and symptoms become worse the pt is to go to the ER.          Procedures

## 2020-11-27 LAB — SARS-COV-2, NAA: NOT DETECTED

## 2021-05-11 ENCOUNTER — TELEPHONE (OUTPATIENT)
Dept: INTERNAL MEDICINE CLINIC | Age: 66
End: 2021-05-11

## 2021-05-11 NOTE — TELEPHONE ENCOUNTER
#[phone]  Pt states she has foot, ankle and toe swelling. She thought it was the hot weather, but this has continued everyday. Pt would like to be seen in person for this unless Dr. Debo Porter wants VV. Please call to schedule.

## 2021-05-14 NOTE — TELEPHONE ENCOUNTER
Called, left vm for pt to return call to office.    **there is a 1045 w/ Dr. Ibeth Whitehead 5/19/21 avail if pt wants

## 2021-05-14 NOTE — TELEPHONE ENCOUNTER
#[phone]  Pt appreciates it, but can't take the appt on 5-19-21 and would like to know if you have something else? In person maybe? UPDATE:  3:26 pm called pt to schedule VV and there was no answer.   tuan

## 2021-05-17 ENCOUNTER — OFFICE VISIT (OUTPATIENT)
Dept: INTERNAL MEDICINE CLINIC | Age: 66
End: 2021-05-17
Payer: MEDICARE

## 2021-05-17 VITALS
SYSTOLIC BLOOD PRESSURE: 119 MMHG | DIASTOLIC BLOOD PRESSURE: 69 MMHG | OXYGEN SATURATION: 100 % | TEMPERATURE: 98.1 F | HEART RATE: 76 BPM | WEIGHT: 173 LBS | BODY MASS INDEX: 28.82 KG/M2 | RESPIRATION RATE: 16 BRPM | HEIGHT: 65 IN

## 2021-05-17 DIAGNOSIS — G35 MS (MULTIPLE SCLEROSIS) (HCC): ICD-10-CM

## 2021-05-17 DIAGNOSIS — Z00.00 MEDICARE ANNUAL WELLNESS VISIT, SUBSEQUENT: ICD-10-CM

## 2021-05-17 DIAGNOSIS — I10 ESSENTIAL HYPERTENSION: ICD-10-CM

## 2021-05-17 DIAGNOSIS — E78.00 PURE HYPERCHOLESTEROLEMIA: ICD-10-CM

## 2021-05-17 DIAGNOSIS — R60.0 LEG EDEMA: Primary | ICD-10-CM

## 2021-05-17 DIAGNOSIS — Z23 ENCOUNTER FOR IMMUNIZATION: ICD-10-CM

## 2021-05-17 PROCEDURE — G8754 DIAS BP LESS 90: HCPCS | Performed by: INTERNAL MEDICINE

## 2021-05-17 PROCEDURE — G8427 DOCREV CUR MEDS BY ELIG CLIN: HCPCS | Performed by: INTERNAL MEDICINE

## 2021-05-17 PROCEDURE — 90732 PPSV23 VACC 2 YRS+ SUBQ/IM: CPT | Performed by: INTERNAL MEDICINE

## 2021-05-17 PROCEDURE — 1090F PRES/ABSN URINE INCON ASSESS: CPT | Performed by: INTERNAL MEDICINE

## 2021-05-17 PROCEDURE — G0439 PPPS, SUBSEQ VISIT: HCPCS | Performed by: INTERNAL MEDICINE

## 2021-05-17 PROCEDURE — G9711 PT HX TOT COL OR COLON CA: HCPCS | Performed by: INTERNAL MEDICINE

## 2021-05-17 PROCEDURE — G8510 SCR DEP NEG, NO PLAN REQD: HCPCS | Performed by: INTERNAL MEDICINE

## 2021-05-17 PROCEDURE — G0463 HOSPITAL OUTPT CLINIC VISIT: HCPCS | Performed by: INTERNAL MEDICINE

## 2021-05-17 PROCEDURE — G8536 NO DOC ELDER MAL SCRN: HCPCS | Performed by: INTERNAL MEDICINE

## 2021-05-17 PROCEDURE — G8400 PT W/DXA NO RESULTS DOC: HCPCS | Performed by: INTERNAL MEDICINE

## 2021-05-17 PROCEDURE — 1101F PT FALLS ASSESS-DOCD LE1/YR: CPT | Performed by: INTERNAL MEDICINE

## 2021-05-17 PROCEDURE — G8419 CALC BMI OUT NRM PARAM NOF/U: HCPCS | Performed by: INTERNAL MEDICINE

## 2021-05-17 PROCEDURE — G8752 SYS BP LESS 140: HCPCS | Performed by: INTERNAL MEDICINE

## 2021-05-17 PROCEDURE — 99213 OFFICE O/P EST LOW 20 MIN: CPT | Performed by: INTERNAL MEDICINE

## 2021-05-17 RX ORDER — SIPONIMOD 2 MG/1
TABLET, FILM COATED ORAL
COMMUNITY
Start: 2021-05-04

## 2021-05-17 NOTE — PATIENT INSTRUCTIONS
Office Policies Phone calls/patient messages: Please allow up to 24 hours for someone in the office to contact you about your call or message. Be mindful your provider may be out of the office or your message may require further review. We encourage you to use [employer] for your messages as this is a faster, more efficient way to communicate with our office Medication Refills: 
         
Prescription medications require 48-72 business hours to process. We encourage you to use [employer] for your refills. For controlled medications: Please allow 72 business hours to process. Certain medications may require you to  a written prescription at our office. NO narcotic/controlled medications will be prescribed after 4pm Monday through Friday or on weekends Form/Paperwork Completion: 
         
Please note a $25 fee may incur for all paperwork for completed by our providers. We ask that you allow 7-10 business days. Pre-payment is due prior to picking up/faxing the completed form. You may also download your forms to [employer] to have your doctor print off. Medicare Wellness Visit, Female The best way to live healthy is to have a lifestyle where you eat a well-balanced diet, exercise regularly, limit alcohol use, and quit all forms of tobacco/nicotine, if applicable. Regular preventive services are another way to keep healthy. Preventive services (vaccines, screening tests, monitoring & exams) can help personalize your care plan, which helps you manage your own care. Screening tests can find health problems at the earliest stages, when they are easiest to treat. [facility] follows the current, evidence-based guidelines published by the [facility] (USPSTF) when recommending preventive services for our patients.  Because we follow these guidelines, sometimes recommendations change over time as research supports it. (For example, mammograms used to be recommended annually. Even though Medicare will still pay for an annual mammogram, the newer guidelines recommend a mammogram every two years for women of average risk). Of course, you and your doctor may decide to screen more often for some diseases, based on your risk and your co-morbidities (chronic disease you are already diagnosed with). Preventive services for you include: - Medicare offers their members a free annual wellness visit, which is time for you and your primary care provider to discuss and plan for your preventive service needs. Take advantage of this benefit every year! 
-All adults over the age of 72 should receive the recommended pneumonia vaccines. Current USPSTF guidelines recommend a series of two vaccines for the best pneumonia protection.  
-All adults should have a flu vaccine yearly and a tetanus vaccine every 10 years.  
-All adults age 48 and older should receive the shingles vaccines (series of two vaccines). -All adults age 38-68 who are overweight should have a diabetes screening test once every three years.  
-All adults born between [birth year] and 1965 should be screened once for Hepatitis C. 
-Other screening tests and preventive services for persons with diabetes include: an eye exam to screen for diabetic retinopathy, a kidney function test, a foot exam, and stricter control over your cholesterol.  
-Cardiovascular screening for adults with routine risk involves an electrocardiogram (ECG) at intervals determined by your doctor.  
-Colorectal cancer screenings should be done for adults age 54-65 with no increased risk factors for colorectal cancer. There are a number of acceptable methods of screening for this type of cancer. Each test has its own benefits and drawbacks. Discuss with your doctor what is most appropriate for you during your annual wellness visit.  The different tests include: colonoscopy (considered the best screening method), a fecal occult blood test, a fecal DNA test, and sigmoidoscopy. 
 
-A bone mass density test is recommended when a woman turns 65 to screen for osteoporosis. This test is only recommended one time, as a screening. Some providers will use this same test as a disease monitoring tool if you already have osteoporosis. -Breast cancer screenings are recommended every other year for women of normal risk, age 54-69. 
-Cervical cancer screenings for women over age 72 are only recommended with certain risk factors. Here is a list of your current Health Maintenance items (your personalized list of preventive services) with a due date: 
Health Maintenance Due Topic Date Due  
 DTaP/Tdap/Td  (1 - Tdap) Never done  Shingles Vaccine (1 of 2) Never done  Mammogram  07/19/2018  Bone Mineral Density   Never done  Pneumococcal Vaccine (1 of 1 - PPSV23) Never done

## 2021-05-17 NOTE — PROGRESS NOTES
Cem Dunlap is a 76 y.o. female who presents for routine immunizations. She denies any symptoms , reactions or allergies that would exclude them from being immunized today. Risks and adverse reactions were discussed and the VIS was given to them. All questions were addressed. She was observed for 10  min post injection. There were no reactions observed.     Regis Lawrence LPN

## 2021-05-17 NOTE — PROGRESS NOTES
HISTORY OF PRESENT ILLNESS  Carine Torres is a 76 y.o. female. HPI   Hx HTN HLD colon cancer 2006, MS     Here for acute care and medicare wellness--  C/o left > right leg edema x years but worse and usually onlly occurs in the summer  compression socks tend to help  Stable weight, denies leg or calf pain  No cp sob /watts    No longer on infusion for MS --on oral med per neurologist at [facility] but had mild flare up in February  Uses walker for long distances  Had pap and mammogram past year and DEXA last year at gyn [facility] office per pt    Last OV  Pt here for CPE and f/u HTN HLD hx colon cancer 2006 , MS  On infusion ocrelizamab at [facility] Dr Poppy Elmore for relapsing MS-every 6 mos  Some leg weakness  2 yrs since last year  Has rolling walker -keeps her Ellen Marie  Retired 2011     Saw cardiologist recently and norvasc lowered from 5 to 2.5 mg every day for leg edema-unchanged  Only has leg edema in warm weather months     [facility] procuedure for [facility]    Patient Active Problem List    Diagnosis Date Noted    BMI 28.0-28.9,adult 11/11/2018    Ankle edema, bilateral--L>R 04/13/2017    S/P radiofrequency ablation operation for arrhythmia--7/16/16 04/13/2017    Mixed dyslipidemia 04/13/2017    Abdominal wall mass of right lower quadrant 01/30/2013    HTN (hypertension) 08/29/2011    Colon cancer ([facility] [address] 08/29/2011    MS (multiple sclerosis) ([facility] [address] 08/29/2011    Fibroids 01/18/2011     Current Outpatient Medications   Medication Sig Dispense Refill    Mayzent 2 mg tab       hydroCHLOROthiazide (HYDRODIURIL) 25 mg tablet TAKE 1 TABLET BY MOUTH DAILY 90 Tab 0    amLODIPine (NORVASC) 2.5 mg tablet Take  by mouth daily.  Dalfampridine (AMPYRA) 10 mg Tb12 Take 10 mg by mouth two (2) times a day.  cholecalciferol, vitamin D3, (VITAMIN D3) 2,000 unit tab Take  by mouth.  estradiol (ESTRADIOL TRANSDERMAL PATCH) 0.05 mg/24 hr 1 Patch by TransDERmal route Every Saturday.       prednisoLONE acetate (PRED FORTE) 1 % ophthalmic suspension Administer 1 Drop to both eyes three (3) days a week. Indications: SEVERE OCULAR INFLAMMATION      ocrelizumab (OCREVUS IV) by IntraVENous route. Q 6 months       No Known Allergies   Lab Results   Component Value Date/Time    WBC 7.7 07/23/2018 11:25 AM    HGB 12.9 07/23/2018 11:25 AM    Hemoglobin (POC) 12.9 01/18/2011 08:35 AM    HCT 39.2 07/23/2018 11:25 AM    Hematocrit (POC) 38 01/18/2011 08:35 AM    PLATELET 121 85/08/5655 11:25 AM    MCV 77 (L) 07/23/2018 11:25 AM     Lab Results   Component Value Date/Time    Hemoglobin A1c 5.6 12/07/2015 09:20 AM    Glucose 86 07/23/2018 11:25 AM    Glucose (POC) 80 01/18/2011 08:35 AM    LDL, calculated 118 (H) 07/23/2018 11:25 AM    Creatinine (POC) 0.8 01/18/2011 08:35 AM    Creatinine 0.76 07/23/2018 11:25 AM      Lab Results   Component Value Date/Time    Cholesterol, total 209 (H) 07/23/2018 11:25 AM    HDL Cholesterol 76 07/23/2018 11:25 AM    LDL, calculated 118 (H) 07/23/2018 11:25 AM    Triglyceride 75 07/23/2018 11:25 AM     Lab Results   Component Value Date/Time    GFR est non-AA 84 07/23/2018 11:25 AM    GFRNA, POC >60 01/18/2011 08:35 AM    GFR est AA 97 07/23/2018 11:25 AM    GFRAA, POC >60 01/18/2011 08:35 AM    Creatinine 0.76 07/23/2018 11:25 AM    Creatinine (POC) 0.8 01/18/2011 08:35 AM    BUN 14 07/23/2018 11:25 AM    BUN (POC) 7 (L) 01/18/2011 08:35 AM    Sodium 141 07/23/2018 11:25 AM    Sodium (POC) 140 01/18/2011 08:35 AM    Potassium 4.0 07/23/2018 11:25 AM    Potassium (POC) 3.8 01/18/2011 08:35 AM    Chloride 100 07/23/2018 11:25 AM    Chloride (POC) 103 01/18/2011 08:35 AM    CO2 28 07/23/2018 11:25 AM        ROS    Physical Exam  Vitals signs and nursing note reviewed. Constitutional:       Appearance: She is well-developed. Comments: Appears stated age   Cardiovascular:      Rate and Rhythm: Normal rate and regular rhythm. Heart sounds: Normal heart sounds. No murmur.  No friction rub. No gallop. Pulmonary:      Effort: Pulmonary effort is normal. No respiratory distress. Breath sounds: Normal breath sounds. No wheezing. Abdominal:      General: Bowel sounds are normal.      Palpations: Abdomen is soft. Musculoskeletal:      Comments: Mild b/l LE edema left> right leg   Neurological:      Mental Status: She is alert. ASSESSMENT and PLAN  Diagnoses and all orders for this visit:    1. Leg edema  -     DUPLEX LOWER EXT VENOUS BILAT; Future  -     METABOLIC PANEL, COMPREHENSIVE; Future    2. Essential hypertension  -     CBC W/O DIFF; Future  -     METABOLIC PANEL, COMPREHENSIVE; Future    3. MS (multiple sclerosis) ([facility] [address]    4. Pure hypercholesterolemia  -     LIPID PANEL; Future  -     METABOLIC PANEL, COMPREHENSIVE; Future  -     TSH 3RD GENERATION; Future    5. Encounter for immunization  -     PNEUMOCOCCAL POLYSACCHARIDE VACCINE, 23-VALENT, ADULT OR IMMUNOSUPPRESSED PT DOSE,      Follow-up and Dispositions    · Return in about 1 year (around 5/17/2022) for cpe. This is the Subsequent Medicare Annual Wellness Exam, performed 12 months or more after the Initial AWV or the last Subsequent AWV    I have reviewed the patient's medical history in detail and updated the computerized patient record. Assessment/Plan   Education and counseling provided:  Are appropriate based on today's review and evaluation  End-of-Life planning (with patient's consent)-see ACP note  Pneumococcal Vaccine-done today  tdap and shingrix recommended    1. Leg edema    Venous dopplers   Elevate legs , stockings for Venous insufficiency    2. HTN   Controlled   Check labs    3. HLD   Check lipids    4.  MS    per [facility] neurologist    Depression Risk Factor Screening     3 most recent PHQ Screens 5/17/2021   Little interest or pleasure in doing things Not at all   Feeling down, depressed, irritable, or hopeless Not at all   Total Score PHQ 2 0       Alcohol Risk Screen    Do you average more than 1 drink per night or more than 7 drinks a week:  No    On any one occasion in the past three months have you have had more than 3 drinks containing alcohol:  No        Functional Ability and Level of Safety    Hearing: Hearing is good. Activities of Daily Living: The home contains: handrails and grab bars  Patient does total self care      Ambulation: with mild difficulty     Fall Risk:  Fall Risk Assessment, last 12 mths 5/17/2021   Able to walk? Yes   Fall in past 12 months? 1   Do you feel unsteady? 1   Are you worried about falling 0   Is TUG test greater than 12 seconds? 1   Is the gait abnormal? 1   Number of falls in past 12 months 2   Fall with injury?  0      Abuse Screen:  Patient is not abused       Cognitive Screening    Has your family/caregiver stated any concerns about your memory: no     Cognitive Screening: Normal - Clock Drawing Test, serial 3    Health Maintenance Due     Health Maintenance Due   Topic Date Due    DTaP/Tdap/Td series (1 - Tdap) Never done    Shingrix Vaccine Age 50> (1 of 2) Never done    Breast Cancer Screen Mammogram  07/19/2018    Bone Densitometry (Dexa) Screening  Never done    Pneumococcal 65+ years (1 of 1 - PPSV23) Never done    Medicare Yearly Exam  Never done       Patient Care Team   Patient Care Team:  Dian Carballo MD as PCP - Quan Moser MD as PCP - [facility]  Shirley Cho RN as [address] [place] [address]  Ramon Power MD as Consulting Provider (Neurology)  Mikki Napier NP as Nurse Practitioner (Nurse Practitioner)    History     Patient Active Problem List   Diagnosis Code    Fibroids D21.9    HTN (hypertension) I10    Colon cancer ([facility] [address] C18.9    MS (multiple sclerosis) ([facility] [address] G35    Abdominal wall mass of right lower quadrant R19.03    Ankle edema, bilateral--L>R M25.471, M25.472    S/P radiofrequency ablation operation for arrhythmia--7/16/16  Z98.890, Z86.79    Mixed dyslipidemia E78.2    BMI 28.0-28.9,adult Z68.28 Past Medical History:   Diagnosis Date    Arrhythmia     Essential hypertension     Hypertension     MS (multiple sclerosis) ([facility] [address]     Musculoskeletal disorder     Other ill-defined conditions(799.89)     multiple sclerosis    Unspecified adverse effect of anesthesia     given versed and lasted 24 hours      Past Surgical History:   Procedure Laterality Date    COLONOSCOPY  4/18/2011         COLONOSCOPY N/A 6/4/2020    [facility]  6/4/2020         HX DILATION AND CURETTAGE      HX HERNIA REPAIR      left    HX HYSTERECTOMY  2011    HX OTHER SURGICAL      tendonitis repair    HX OTHER SURGICAL      cardiac ablation    HX OTHER SURGICAL      bilateral cataracts surgery    HX OTHER SURGICAL      left eye muscle surgery    HX TOTAL COLECTOMY  2006    villous adenoma    NV CARDIAC SURG PROCEDURE UNLIST      coronary ablations     Current Outpatient Medications   Medication Sig Dispense Refill    Mayzent 2 mg tab       hydroCHLOROthiazide (HYDRODIURIL) 25 mg tablet TAKE 1 TABLET BY MOUTH DAILY 90 Tab 0    amLODIPine (NORVASC) 2.5 mg tablet Take  by mouth daily.  Dalfampridine (AMPYRA) 10 mg Tb12 Take 10 mg by mouth two (2) times a day.  cholecalciferol, vitamin D3, (VITAMIN D3) 2,000 unit tab Take  by mouth.  estradiol (ESTRADIOL TRANSDERMAL PATCH) 0.05 mg/24 hr 1 Patch by TransDERmal route Every Saturday.  prednisoLONE acetate (PRED FORTE) 1 % ophthalmic suspension Administer 1 Drop to both eyes three (3) days a week. Indications: SEVERE OCULAR INFLAMMATION      ocrelizumab (OCREVUS IV) by IntraVENous route.  Q 6 months       No Known Allergies    Family History   Problem Relation Age of Onset    Heart Disease Father     Diabetes Sister     Hypertension Sister     Diabetes Mother     Hypertension Mother     Elevated Lipids Mother      Social History     Tobacco Use    Smoking status: Never Smoker  Smokeless tobacco: Never Used   Substance Use Topics    Alcohol use: Yes     Frequency: 2-4 times a month     Drinks per session: 1 or 2     Binge frequency: Never     Comment: rarely         Jaime Sanchez MD

## 2021-05-24 ENCOUNTER — HOSPITAL ENCOUNTER (OUTPATIENT)
Dept: VASCULAR SURGERY | Age: 66
Discharge: HOME | End: 2021-05-24
Attending: INTERNAL MEDICINE
Payer: MEDICARE

## 2021-05-24 DIAGNOSIS — R60.0 LEG EDEMA: ICD-10-CM

## 2021-05-24 PROCEDURE — 93970 EXTREMITY STUDY: CPT

## 2021-05-28 NOTE — TELEPHONE ENCOUNTER
----- Message from Álvaro Bullock MD sent at 5/27/2021  8:45 AM EDT -----  Tell pt no blood clots in either leg, normal veins.  Recommend elevation and support hose prn edema

## 2021-05-28 NOTE — TELEPHONE ENCOUNTER
Called patient. ID verified with Name and . Spoke with patient in regards to duplex results. Informed patient that no blood clots were detected in either leg and that provider recommends that patient utilizes elevation and support stockings for edema. Patient states that she understands the information received at this time. Patient had no further questions or concerns at this time.

## 2021-06-29 DIAGNOSIS — M25.471 ANKLE EDEMA, BILATERAL: ICD-10-CM

## 2021-06-29 DIAGNOSIS — I10 ESSENTIAL HYPERTENSION: ICD-10-CM

## 2021-06-29 DIAGNOSIS — M25.472 ANKLE EDEMA, BILATERAL: ICD-10-CM

## 2021-06-29 RX ORDER — AMLODIPINE BESYLATE 2.5 MG/1
2.5 TABLET ORAL DAILY
Qty: 30 TABLET | Refills: 3 | Status: SHIPPED | OUTPATIENT
Start: 2021-06-29 | End: 2021-06-29 | Stop reason: SDUPTHER

## 2021-06-29 RX ORDER — AMLODIPINE BESYLATE 5 MG/1
TABLET ORAL
Qty: 90 TABLET | Refills: 3 | Status: SHIPPED | OUTPATIENT
Start: 2021-06-29 | End: 2021-06-29 | Stop reason: CLARIF

## 2021-06-29 RX ORDER — AMLODIPINE BESYLATE 2.5 MG/1
2.5 TABLET ORAL DAILY
Qty: 90 TABLET | Refills: 3 | Status: SHIPPED | OUTPATIENT
Start: 2021-06-29 | End: 2021-09-25

## 2021-06-29 NOTE — TELEPHONE ENCOUNTER
Future Appointments:  No future appointments. Last Appointment With Me:  5/17/2021     Requested Prescriptions     Pending Prescriptions Disp Refills    amLODIPine (Norvasc) 2.5 mg tablet 30 Tablet 3     Sig: Take 1 Tablet by mouth daily.

## 2021-06-29 NOTE — TELEPHONE ENCOUNTER
----- Message from Farzaneh Tran sent at 6/29/2021 11:52 AM EDT -----  Regarding: Dr. Eloina Han (if not patient): Pt       Relationship of caller (if not patient): self       Best contact number(s): [phone]      Name of medication and dosage if known: amlodipine 2.5 mg       Is patient out of this medication (yes/no): no       Pharmacy name: [facility]    Pharmacy listed in chart? (yes/no): yes   Pharmacy phone number:   [phone]        Details to clarify the request: N/A      Farzaneh Tran

## 2021-09-25 RX ORDER — AMLODIPINE BESYLATE 2.5 MG/1
TABLET ORAL
Qty: 90 TABLET | Refills: 1 | Status: SHIPPED | OUTPATIENT
Start: 2021-09-25 | End: 2022-03-27

## 2021-10-21 ENCOUNTER — TELEPHONE (OUTPATIENT)
Dept: INTERNAL MEDICINE CLINIC | Age: 66
End: 2021-10-21

## 2021-10-21 NOTE — TELEPHONE ENCOUNTER
Called patient. ID verified with Name and . Spoke with patient in regards to message received. Patient states that, 2-3 weeks ago, patient began to feel dehydrated. Patient states that, no matter how much she drinks, she feels like she can never get enough to quench thirst. Patient states that she suspected that her BS was the cause but states they have been within normal range.

## 2021-10-21 NOTE — TELEPHONE ENCOUNTER
Called patient. ID verified with Name and . Spoke with patient in regards to information received. Informed patient that provider recommends that patient be seen at [facility] for lab testing. Patient states that she understands the information received and has no further questions or concerns at this time.

## 2021-10-21 NOTE — TELEPHONE ENCOUNTER
----- Message from Satinder Conte sent at 10/20/2021  4:44 PM EDT -----  Subject: Message to Provider    QUESTIONS  Information for Provider? pt is complaining about feeling dehydrated and   drinking alot of water constantly, she is going to the bathroom for that   reason as well. please call   ---------------------------------------------------------------------------  --------------  CALL BACK INFO  What is the best way for the office to contact you? OK to leave message on   voicemail  Preferred Call Back Phone Number? [phone]  ---------------------------------------------------------------------------  --------------  SCRIPT ANSWERS  Relationship to Patient?  Self

## 2021-12-17 ENCOUNTER — OFFICE VISIT (OUTPATIENT)
Dept: URGENT CARE | Age: 66
End: 2021-12-17
Payer: MEDICARE

## 2021-12-17 VITALS — OXYGEN SATURATION: 95 % | TEMPERATURE: 97.7 F | RESPIRATION RATE: 18 BRPM | HEART RATE: 86 BPM

## 2021-12-17 DIAGNOSIS — Z20.822 EXPOSURE TO COVID-19 VIRUS: Primary | ICD-10-CM

## 2021-12-17 LAB — SARS-COV-2 POC: NEGATIVE

## 2021-12-17 PROCEDURE — G8432 DEP SCR NOT DOC, RNG: HCPCS | Performed by: NURSE PRACTITIONER

## 2021-12-17 PROCEDURE — 87426 SARSCOV CORONAVIRUS AG IA: CPT | Performed by: NURSE PRACTITIONER

## 2021-12-17 PROCEDURE — G9711 PT HX TOT COL OR COLON CA: HCPCS | Performed by: NURSE PRACTITIONER

## 2021-12-17 PROCEDURE — G8427 DOCREV CUR MEDS BY ELIG CLIN: HCPCS | Performed by: NURSE PRACTITIONER

## 2021-12-17 PROCEDURE — G8536 NO DOC ELDER MAL SCRN: HCPCS | Performed by: NURSE PRACTITIONER

## 2021-12-17 PROCEDURE — 1090F PRES/ABSN URINE INCON ASSESS: CPT | Performed by: NURSE PRACTITIONER

## 2021-12-17 PROCEDURE — 1101F PT FALLS ASSESS-DOCD LE1/YR: CPT | Performed by: NURSE PRACTITIONER

## 2021-12-17 PROCEDURE — G8400 PT W/DXA NO RESULTS DOC: HCPCS | Performed by: NURSE PRACTITIONER

## 2021-12-17 PROCEDURE — 99213 OFFICE O/P EST LOW 20 MIN: CPT | Performed by: NURSE PRACTITIONER

## 2021-12-17 PROCEDURE — G8419 CALC BMI OUT NRM PARAM NOF/U: HCPCS | Performed by: NURSE PRACTITIONER

## 2021-12-17 PROCEDURE — G8756 NO BP MEASURE DOC: HCPCS | Performed by: NURSE PRACTITIONER

## 2021-12-17 NOTE — PROGRESS NOTES
This patient was seen at [address] [place] [address] Urgent Care while in their vehicle due to COVID-19 pandemic with PPE and focused examination in order to decrease community viral transmission. The patient/guardian gave verbal consent to treat. Mandie Feldman is a 78 y.o. female who presents for COVID-19 testing. Was exposed to COVID-19 about 6 days ago. Denies any symptoms such as cough, SOB, chest tightness, congestion, ST, HA, n/v/d, fever etc. No other complaints or concerns at this time. Patient completed COVID vaccinations x 2 more than one month ago. PMH: MS, HTN. Non-smoker.              Past Medical History:   Diagnosis Date    Arrhythmia     Essential hypertension     Hypertension     MS (multiple sclerosis) ([facility] [address]     Musculoskeletal disorder     Other ill-defined conditions(799.89)     multiple sclerosis    Unspecified adverse effect of anesthesia     given versed and lasted 24 hours        Past Surgical History:   Procedure Laterality Date    COLONOSCOPY  4/18/2011         COLONOSCOPY N/A 6/4/2020    COL[facility]  6/4/2020         HX DILATION AND CURETTAGE      HX HERNIA REPAIR      left    HX HYSTERECTOMY  2011    HX OTHER SURGICAL      tendonitis repair    HX OTHER SURGICAL      cardiac ablation    HX OTHER SURGICAL      bilateral cataracts surgery    HX OTHER SURGICAL      left eye muscle surgery    HX TOTAL COLECTOMY  2006    villous adenoma    [place] CARDIAC SURG PROCEDURE UNLIST      coronary ablations         Family History   Problem Relation Age of Onset    Heart Disease Father     Diabetes Sister     Hypertension Sister     Diabetes Mother     Hypertension Mother     Elevated Lipids Mother         Social History     Socioeconomic History    Marital status: SINGLE     Spouse name: Not on file    Number of children: Not on file    Years of education: Not on file    Highest education level: Not on file Occupational History    Not on file   Tobacco Use    Smoking status: Never Smoker    Smokeless tobacco: Never Used   Substance and Sexual Activity    Alcohol use: Yes     Comment: rarely    Drug use: Yes     Types: Prescription, OTC    Sexual activity: Yes     Partners: Male   Other Topics Concern    Not on file   Social History Narrative    Not on file     Social Determinants of Health     Financial Resource Strain:     Difficulty of Paying Living Expenses: Not on file   Food Insecurity:     Worried About Running Out of Food in the Last Year: Not on file    Rafia of Food in the Last Year: Not on file   Transportation Needs:     Lack of Transportation (Medical): Not on file    Lack of Transportation (Non-Medical): Not on file   Physical Activity:     Days of Exercise per Week: Not on file    Minutes of Exercise per Session: Not on file   Stress:     Feeling of Stress : Not on file   Social Connections:     Frequency of Communication with Friends and Family: Not on file    Frequency of Social Gatherings with Friends and Family: Not on file    Attends Taoism Services: Not on file    Active Member of [address] [place] [address] or Organizations: Not on file    Attends Club or Organization Meetings: Not on file    Marital Status: Not on file   Intimate Partner Violence:     Fear of Current or Ex-Partner: Not on file    Emotionally Abused: Not on file    Physically Abused: Not on file    Sexually Abused: Not on file   Housing Stability:     Unable to Pay for Housing in the Last Year: Not on file    Number of [place] in the Last Year: Not on file    Unstable Housing in the Last Year: Not on file                ALLERGIES: Patient has no known allergies. Review of Systems   Constitutional: Negative for activity change, appetite change, chills, diaphoresis, fatigue and fever. HENT: Negative for congestion, ear pain, rhinorrhea, sinus pressure, sinus pain and sore throat.     Respiratory: Negative for cough, chest tightness, shortness of breath and wheezing. Cardiovascular: Negative for chest pain. Gastrointestinal: Negative for abdominal pain, constipation, diarrhea, nausea and vomiting. Musculoskeletal: Negative for myalgias. Skin: Negative for rash. Neurological: Negative for dizziness, weakness, light-headedness and headaches. Vitals:    12/17/21 0911   Pulse: 86   Resp: 18   Temp: 97.7 °F (36.5 °C)   SpO2: 95%       Physical Exam  Vitals and nursing note reviewed. Constitutional:       General: She is not in acute distress. Appearance: Normal appearance. She is not ill-appearing. HENT:      Head: Normocephalic and atraumatic. Mouth/Throat:      Mouth: Mucous membranes are moist.   Eyes:      Conjunctiva/sclera: Conjunctivae normal.      Pupils: Pupils are equal, round, and reactive to light. Cardiovascular:      Rate and Rhythm: Normal rate. Pulmonary:      Effort: Pulmonary effort is normal.   Musculoskeletal:         General: Normal range of motion. Cervical back: No muscular tenderness. Skin:     General: Skin is warm and dry. Findings: No rash. Neurological:      Mental Status: She is alert and oriented to person, place, and time. Psychiatric:         Mood and Affect: Mood normal.         Behavior: Behavior normal.         MDM    Procedures      ICD-10-CM ICD-9-CM   1. Exposure to COVID-19 virus  Z20.822 V01.79       Orders Placed This Encounter    AMB POC SARS-COV-2 ANTIGEN     Order Specific Question:   Is this test for diagnosis or screening? Answer:   Screening     Order Specific Question:   Symptomatic for COVID-19 as defined by CDC? Answer:   No     Order Specific Question:   Hospitalized for COVID-19? Answer:   No     Order Specific Question:   Admitted to ICU for COVID-19? Answer:   No     Order Specific Question:   Employed in healthcare setting?      Answer:   Unknown     Order Specific Question:   Resident in a congregate (group) care setting? Answer:   Unknown     Order Specific Question:   Pregnant? Answer:   No     Order Specific Question:   Previously tested for COVID-19? Answer:   Yes      Results for orders placed or performed in visit on 12/17/21   AMB POC SARS-COV-2   Result Value Ref Range    SARS-COV-2 POC Negative Negative     Quarantine recommendations reviewed per CDC guidelines. Encouraged deep breathing exercises, ambulation, Tylenol prn- should symptoms develop. Increase fluids with electrolytes    The patient is to follow up with PCP PRN. Red flag signs and symptoms discussed with patient/parent indicated need for ED evaluation and treatment.     Signed By: Skylar Jones NP     December 17, 2021

## 2022-03-19 PROBLEM — Z86.79 S/P RADIOFREQUENCY ABLATION OPERATION FOR ARRHYTHMIA: Status: ACTIVE | Noted: 2017-04-13

## 2022-03-19 PROBLEM — Z98.890 S/P RADIOFREQUENCY ABLATION OPERATION FOR ARRHYTHMIA: Status: ACTIVE | Noted: 2017-04-13

## 2022-03-19 PROBLEM — M25.471 ANKLE EDEMA, BILATERAL: Status: ACTIVE | Noted: 2017-04-13

## 2022-03-19 PROBLEM — M25.472 ANKLE EDEMA, BILATERAL: Status: ACTIVE | Noted: 2017-04-13

## 2022-03-20 PROBLEM — E78.2 MIXED DYSLIPIDEMIA: Status: ACTIVE | Noted: 2017-04-13

## 2022-03-27 RX ORDER — AMLODIPINE BESYLATE 2.5 MG/1
TABLET ORAL
Qty: 90 TABLET | Refills: 1 | Status: SHIPPED | OUTPATIENT
Start: 2022-03-27 | End: 2022-09-30

## 2022-04-18 ENCOUNTER — PATIENT MESSAGE (OUTPATIENT)
Dept: INTERNAL MEDICINE CLINIC | Age: 67
End: 2022-04-18

## 2022-08-04 RX ORDER — HYDROCHLOROTHIAZIDE 25 MG/1
TABLET ORAL
Qty: 90 TABLET | Refills: 3 | Status: SHIPPED | OUTPATIENT
Start: 2022-08-04

## 2022-11-11 RX ORDER — AMLODIPINE BESYLATE 2.5 MG/1
TABLET ORAL
Qty: 90 TABLET | Refills: 0 | Status: SHIPPED | OUTPATIENT
Start: 2022-11-11

## 2022-12-02 ENCOUNTER — HOSPITAL ENCOUNTER (EMERGENCY)
Age: 67
Discharge: HOME | End: 2022-12-02
Attending: STUDENT IN AN ORGANIZED HEALTH CARE EDUCATION/TRAINING PROGRAM
Payer: MEDICARE

## 2022-12-02 ENCOUNTER — APPOINTMENT (OUTPATIENT)
Dept: CT IMAGING | Age: 67
End: 2022-12-02
Attending: STUDENT IN AN ORGANIZED HEALTH CARE EDUCATION/TRAINING PROGRAM
Payer: MEDICARE

## 2022-12-02 VITALS
TEMPERATURE: 97.5 F | SYSTOLIC BLOOD PRESSURE: 153 MMHG | RESPIRATION RATE: 14 BRPM | OXYGEN SATURATION: 100 % | BODY MASS INDEX: 28.32 KG/M2 | WEIGHT: 170 LBS | HEART RATE: 68 BPM | DIASTOLIC BLOOD PRESSURE: 68 MMHG | HEIGHT: 65 IN

## 2022-12-02 DIAGNOSIS — E87.6 HYPOKALEMIA: ICD-10-CM

## 2022-12-02 DIAGNOSIS — R42 DIZZINESS: Primary | ICD-10-CM

## 2022-12-02 DIAGNOSIS — S01.01XA LACERATION OF SCALP, INITIAL ENCOUNTER: ICD-10-CM

## 2022-12-02 LAB
ALBUMIN SERPL-MCNC: 3.4 G/DL (ref 3.5–5)
ALBUMIN/GLOB SERPL: 0.9 {RATIO} (ref 1.1–2.2)
ALP SERPL-CCNC: 72 U/L (ref 45–117)
ALT SERPL-CCNC: 23 U/L (ref 12–78)
ANION GAP SERPL CALC-SCNC: 4 MMOL/L (ref 5–15)
AST SERPL-CCNC: 15 U/L (ref 15–37)
BASOPHILS # BLD: 0 K/UL (ref 0–0.1)
BASOPHILS NFR BLD: 0 % (ref 0–1)
BILIRUB SERPL-MCNC: 0.6 MG/DL (ref 0.2–1)
BUN SERPL-MCNC: 20 MG/DL (ref 6–20)
BUN/CREAT SERPL: 27 (ref 12–20)
CALCIUM SERPL-MCNC: 9.2 MG/DL (ref 8.5–10.1)
CHLORIDE SERPL-SCNC: 106 MMOL/L (ref 97–108)
CO2 SERPL-SCNC: 32 MMOL/L (ref 21–32)
CREAT SERPL-MCNC: 0.73 MG/DL (ref 0.55–1.02)
DIFFERENTIAL METHOD BLD: ABNORMAL
EOSINOPHIL # BLD: 0 K/UL (ref 0–0.4)
EOSINOPHIL NFR BLD: 0 % (ref 0–7)
ERYTHROCYTE [DISTWIDTH] IN BLOOD BY AUTOMATED COUNT: 16.1 % (ref 11.5–14.5)
GLOBULIN SER CALC-MCNC: 3.6 G/DL (ref 2–4)
GLUCOSE SERPL-MCNC: 97 MG/DL (ref 65–100)
HCT VFR BLD AUTO: 37.9 % (ref 35–47)
HGB BLD-MCNC: 12.3 G/DL (ref 11.5–16)
IMM GRANULOCYTES # BLD AUTO: 0 K/UL (ref 0–0.04)
IMM GRANULOCYTES NFR BLD AUTO: 0 % (ref 0–0.5)
LYMPHOCYTES # BLD: 0.3 K/UL (ref 0.8–3.5)
LYMPHOCYTES NFR BLD: 3 % (ref 12–49)
MCH RBC QN AUTO: 25.3 PG (ref 26–34)
MCHC RBC AUTO-ENTMCNC: 32.5 G/DL (ref 30–36.5)
MCV RBC AUTO: 77.8 FL (ref 80–99)
MONOCYTES # BLD: 0.8 K/UL (ref 0–1)
MONOCYTES NFR BLD: 7 % (ref 5–13)
NEUTS SEG # BLD: 10.4 K/UL (ref 1.8–8)
NEUTS SEG NFR BLD: 90 % (ref 32–75)
NRBC # BLD: 0 K/UL (ref 0–0.01)
NRBC BLD-RTO: 0 PER 100 WBC
PLATELET # BLD AUTO: 259 K/UL (ref 150–400)
PMV BLD AUTO: 10.9 FL (ref 8.9–12.9)
POTASSIUM SERPL-SCNC: 2.9 MMOL/L (ref 3.5–5.1)
PROT SERPL-MCNC: 7 G/DL (ref 6.4–8.2)
RBC # BLD AUTO: 4.87 M/UL (ref 3.8–5.2)
RBC MORPH BLD: ABNORMAL
RBC MORPH BLD: ABNORMAL
SODIUM SERPL-SCNC: 142 MMOL/L (ref 136–145)
TROPONIN-HIGH SENSITIVITY: 7 NG/L (ref 0–51)
TROPONIN-HIGH SENSITIVITY: 8 NG/L (ref 0–51)
WBC # BLD AUTO: 11.5 K/UL (ref 3.6–11)

## 2022-12-02 PROCEDURE — 74011250637 HC RX REV CODE- 250/637: Performed by: STUDENT IN AN ORGANIZED HEALTH CARE EDUCATION/TRAINING PROGRAM

## 2022-12-02 PROCEDURE — 84484 ASSAY OF TROPONIN QUANT: CPT

## 2022-12-02 PROCEDURE — 36415 COLL VENOUS BLD VENIPUNCTURE: CPT

## 2022-12-02 PROCEDURE — 85025 COMPLETE CBC W/AUTO DIFF WBC: CPT

## 2022-12-02 PROCEDURE — 99284 EMERGENCY DEPT VISIT MOD MDM: CPT

## 2022-12-02 PROCEDURE — 80053 COMPREHEN METABOLIC PANEL: CPT

## 2022-12-02 PROCEDURE — 70450 CT HEAD/BRAIN W/O DYE: CPT

## 2022-12-02 PROCEDURE — 93005 ELECTROCARDIOGRAM TRACING: CPT

## 2022-12-02 RX ADMIN — POTASSIUM BICARBONATE 40 MEQ: 782 TABLET, EFFERVESCENT ORAL at 11:14

## 2022-12-02 NOTE — ED PROVIDER NOTES
EMERGENCY DEPARTMENT HISTORY AND PHYSICAL EXAM      Date: 12/2/2022  Patient Name: Roseann Howe    History of Presenting Illness     Chief Complaint   Patient presents with    Fall     Walking out of bathroom with her rolator, got dizzy and fell backward, did hit her had. No pre dizziness. H/o MS. 166/77 rescue bp. Remembers the entire event. Not dizzy now. Laceration     Does have a head laceration back of head that is minimally bleeding well approximated. Pt is Not on a blood thinner. Denies neck pain and no tenderness. History Provided By: Patient    HPI: Roseann Howe, 78 y.o. female presents to the ED with cc of fall this morning. Patient states she was in her bathroom and she had just finished washing her hands when she turned and became dizzy. She states she was trying to hold onto the wall but was unable to. She did not have her Rollator in the bathroom with her. She fell, hitting the back of her head on the shower. She denies any loss of consciousness. She denies any chest pain, shortness of breath, abdominal pain, nausea, vomiting, diarrhea. She reports history of previous falls but states she has never been dizzy prior to a fall like this. She denies any current dizziness. She denies any recent illnesses or feeling unwell prior to this episode today. She denies any headache. She denies any blood thinner use. She denies any neck pain or back pain. There are no other complaints, changes, or physical findings at this time. PCP: Natalie Wilson MD    No current facility-administered medications on file prior to encounter. Current Outpatient Medications on File Prior to Encounter   Medication Sig Dispense Refill    amLODIPine (NORVASC) 2.5 mg tablet TAKE 1 TABLET BY MOUTH EVERY DAY 90 Tablet 0    hydroCHLOROthiazide (HYDRODIURIL) 25 mg tablet TAKE 1 TABLET BY MOUTH DAILY 90 Tablet 3    Mayzent 2 mg tab       ocrelizumab (OCREVUS IV) by IntraVENous route.  Q 6 months Dalfampridine (AMPYRA) 10 mg Tb12 Take 10 mg by mouth two (2) times a day. cholecalciferol, vitamin D3, (VITAMIN D3) 2,000 unit tab Take  by mouth.      estradiol (ESTRADIOL TRANSDERMAL PATCH) 0.05 mg/24 hr 1 Patch by TransDERmal route Every Saturday. prednisoLONE acetate (PRED FORTE) 1 % ophthalmic suspension Administer 1 Drop to both eyes three (3) days a week.  Indications: SEVERE OCULAR INFLAMMATION         Past History     Past Medical History:  Past Medical History:   Diagnosis Date    Arrhythmia     Essential hypertension     Hypertension     MS (multiple sclerosis) ([facility] [address]     Musculoskeletal disorder     Other ill-defined conditions(799.89)     multiple sclerosis    Unspecified adverse effect of anesthesia     given versed and lasted 24 hours       Past Surgical History:  Past Surgical History:   Procedure Laterality Date    COLONOSCOPY  4/18/2011         COLONOSCOPY N/A 6/4/2020    COLONOSCOPY performed by Tootie Canales MD at [facility]COPY    COLONOSCOPY,DIAGNOSTIC  6/4/2020         HX DILATION AND CURETTAGE      HX HERNIA REPAIR      left    HX HYSTERECTOMY  2011    HX OTHER SURGICAL      tendonitis repair    HX OTHER SURGICAL      cardiac ablation    HX OTHER SURGICAL      bilateral cataracts surgery    HX OTHER SURGICAL      left eye muscle surgery    HX TOTAL COLECTOMY  2006    villous adenoma    UT CARDIAC SURG PROCEDURE UNLIST      coronary ablations       Family History:  Family History   Problem Relation Age of Onset    Heart Disease Father     Diabetes Sister     Hypertension Sister     Diabetes Mother     Hypertension Mother     Elevated Lipids Mother        Social History:  Social History     Tobacco Use    Smoking status: Never    Smokeless tobacco: Never   Substance Use Topics    Alcohol use: Yes     Comment: rarely    Drug use: Yes     Types: Prescription, OTC       Allergies:  No Known Allergies      Review of Systems   Review of Systems   Constitutional:  Negative for chills and fever.   HENT:  Negative for rhinorrhea and sore throat. Eyes:  Negative for visual disturbance. Respiratory:  Negative for cough and shortness of breath. Cardiovascular:  Negative for chest pain. Gastrointestinal:  Negative for abdominal pain, diarrhea, nausea and vomiting. Genitourinary:  Negative for dysuria and hematuria. Musculoskeletal:  Negative for arthralgias and myalgias. Skin:  Positive for wound. Negative for rash. Neurological:  Positive for dizziness. Negative for seizures and weakness. All other systems reviewed and are negative. Physical Exam   Physical Exam  Vitals and nursing note reviewed. Constitutional:       Appearance: She is well-developed. HENT:      Head: Normocephalic and atraumatic. Cardiovascular:      Rate and Rhythm: Normal rate and regular rhythm. Pulmonary:      Effort: Pulmonary effort is normal.      Breath sounds: Normal breath sounds. Abdominal:      General: There is no distension. Palpations: Abdomen is soft. Musculoskeletal:         General: Normal range of motion. Cervical back: Neck supple. Skin:     General: Skin is warm and dry. Comments: 1cm laceration to posterior scalp, bleeding controlled   Neurological:      Mental Status: She is alert. Psychiatric:         Mood and Affect: Mood normal.         Behavior: Behavior normal.       Diagnostic Study Results     Labs -   No results found for this or any previous visit (from the past 12 hour(s)). Radiologic Studies -   CT HEAD WO CONT   Final Result   No acute intracranial abnormality. CT Results  (Last 48 hours)                 12/02/22 0926  CT HEAD WO CONT Final result    Impression:  No acute intracranial abnormality. Narrative:  HEAD CT WITHOUT CONTRAST: 12/2/2022 9:26 AM       INDICATION: fall       COMPARISON: MRI brain 9/14/2011.        PROCEDURE: Axial images of the head were obtained without contrast. Coronal and   sagittal reformats were performed. CT dose reduction was achieved through use of   a standardized protocol tailored for this examination and automatic exposure   control for dose modulation. FINDINGS: Periventricular white matter hypodensity is nonspecific, but   consistent with minimal, HIV concordant, chronic, small vessel ischemic disease. The ventricles and sulci are appropriate in size and configuration for age. No   loss of gray-white differentiation to suggest late acute or early subacute   infarction. No mass effect or intracranial hemorrhage. CXR Results  (Last 48 hours)      None            Medical Decision Making   I am the first provider for this patient. I reviewed the vital signs, available nursing notes, past medical history, past surgical history, family history and social history. Vital Signs-Reviewed the patient's vital signs. No data found. Records Reviewed: Nursing Notes, Previous Radiology Studies, and Previous Laboratory Studies    Provider Notes (Medical Decision Making):   Patient presenting with chief complaint of fall this morning, history of MS, compliant with her medications, came dizzy and fell, hitting her head on the shower. On exam she is hemodynamically stable, vital signs stable, neurologic exam is intact, no current dizziness. Differentials include mechanical fall versus orthostatic versus vasovagal versus electrolyte abnormality versus anemia versus ICH versus contusion versus laceration versus fracture. Will obtain basic labs and EKG as well as head CT. ED Course:   Initial assessment performed. The patients presenting problems have been discussed, and they are in agreement with the care plan formulated and outlined with them. I have encouraged them to ask questions as they arise throughout their visit.     Laceration does not require repair, troponin negative, EKG with T wave inversions but patient denying any chest pain shortness of breath or any continual dizziness episodes. Last EKG was from 11 years ago. Will discharge home with outpatient follow-up, follow-up with primary care and cardiology. Return to the emergency department if her symptoms continue or worsen. Given ongoing for precautions which patient understood. Disposition:    DC- Adult Discharges: All of the diagnostic tests were reviewed and questions answered. Diagnosis, care plan and treatment options were discussed. The patient understands the instructions and will follow up as directed. The patients results have been reviewed with them. They have been counseled regarding their diagnosis. The patient verbally convey understanding and agreement of the signs, symptoms, diagnosis, treatment and prognosis and additionally agrees to follow up as recommended with their PCP in 24 - 48 hours. They also agree with the care-plan and convey that all of their questions have been answered. I have also put together some discharge instructions for them that include: 1) educational information regarding their diagnosis, 2) how to care for their diagnosis at home, as well a 3) list of reasons why they would want to return to the ED prior to their follow-up appointment, should their condition change. DC-The patient was given verbal follow-up instructions      DISCHARGE PLAN:  1. Discharge Medication List as of 12/2/2022 12:57 PM        2. Follow-up Information       Follow up With Specialties Details Why Contact Info    Dacia Harp MD Internal Medicine Physician Schedule an appointment as soon as possible for a visit   [address] [place] [address]  [phone]            3. Return to ED if worse     Diagnosis     Clinical Impression:   1. Dizziness    2. Hypokalemia    3. Laceration of scalp, initial encounter        Attestations:    Latha Given, DO        Please note that this dictation was completed with Harir, the [employer] voice recognition software. Quite often unanticipated grammatical, syntax, homophones, and other interpretive errors are inadvertently transcribed by the computer software. Please disregard these errors. Please excuse any errors that have escaped final proofreading. Thank you.

## 2022-12-03 LAB
ATRIAL RATE: 70 BPM
CALCULATED P AXIS, ECG09: 67 DEGREES
CALCULATED R AXIS, ECG10: 50 DEGREES
CALCULATED T AXIS, ECG11: 80 DEGREES
DIAGNOSIS, 93000: NORMAL
P-R INTERVAL, ECG05: 166 MS
Q-T INTERVAL, ECG07: 408 MS
QRS DURATION, ECG06: 72 MS
QTC CALCULATION (BEZET), ECG08: 440 MS
VENTRICULAR RATE, ECG03: 70 BPM

## 2022-12-11 NOTE — PROGRESS NOTES
HISTORY OF PRESENT ILLNESS  Mckenzie Humphries is a 75 y.o. female.   HPI    Last OV     Here for acute care and medicare wellness--  C/o left > right leg edema x years but worse and usually onlly occurs in the summer  compression socks tend to help  Stable weight, denies leg or calf pain  No cp sob /watts     No longer on infusion for MS --on oral med per neurologist at [address] [place] [address] but had mild flare up in February  Uses walker for long distances  Had pap and mammogram past year and DEXA last year at gyn MD office per pt       {Choose one or more SmartLinks; press DELETE if none desired:[id number]}   {Choose one or more Last Lab values; press DELETE if none desired:[id number]}     ROS    Physical Exam    ASSESSMENT and PLAN  {ASSESSMENT/PLAN:07423}

## 2022-12-11 NOTE — PROGRESS NOTES
HISTORY OF PRESENT ILLNESS  Marc Mitchell is a 75 y.o. female. HPI  HISTORY OF PRESENT ILLNESS  Marc Mitchell is a 76 y.o. female. HPI   FU HTN HLD colon cancer 2006, MS and medicare wellness---  ER FU 12/2 for GLF using walker-got dizzy then fell. Posterior scalp laceration-no sutures needed  Felt lightheaded but not now  Head CT -NAD. EKG-nst inf-lat. Labs--K 2.9--oral K given. Taking bananas  Dr Brittany Strong fu last week leg edema--stockings and elevation-mild and controlled now  High fall risk  Sees Neurologist at [facility] Dr Freda Donovan for MS management on ampyra and mayzent and dantrolene abd baclofen  Uses walker at home and outside the house    Gyn MD-Dr Karla Lui  Last mammogram/dexa-next week  Lives alone  Nosmoker , occ eoth  Last ov  Here for acute care and medicare wellness--  C/o left > right leg edema x years but worse and usually onlly occurs in the summer  compression socks tend to help  Stable weight, denies leg or calf pain  No cp sob /watts     No longer on infusion for MS --on oral med per neurologist at [facility] but had mild flare up in February  Uses walker for long distances  Had pap and mammogram past year and DEXA last year at gyn MD office per pt       Patient Active Problem List    Diagnosis Date Noted    BMI 28.0-28.9,adult 11/11/2018    Ankle edema, bilateral--L>R 04/13/2017    S/P radiofrequency ablation operation for arrhythmia--7/16/16 04/13/2017    Mixed dyslipidemia 04/13/2017    Abdominal wall mass of right lower quadrant 01/30/2013    HTN (hypertension) 08/29/2011    Colon cancer ([facility] [address] 08/29/2011    MS (multiple sclerosis) ([facility] [address] 08/29/2011    Fibroids 01/18/2011     Current Outpatient Medications   Medication Sig Dispense Refill    amLODIPine (NORVASC) 2.5 mg tablet TAKE 1 TABLET BY MOUTH EVERY DAY 90 Tablet 0    hydroCHLOROthiazide (HYDRODIURIL) 25 mg tablet TAKE 1 TABLET BY MOUTH DAILY 90 Tablet 3    Mayzent 2 mg tab       ocrelizumab (OCREVUS IV) by IntraVENous route.  Q 6 months      Dalfampridine (AMPYRA) 10 mg Tb12 Take 10 mg by mouth two (2) times a day. cholecalciferol, vitamin D3, (VITAMIN D3) 2,000 unit tab Take  by mouth.      estradiol (ESTRADIOL TRANSDERMAL PATCH) 0.05 mg/24 hr 1 Patch by TransDERmal route Every Saturday. prednisoLONE acetate (PRED FORTE) 1 % ophthalmic suspension Administer 1 Drop to both eyes three (3) days a week.  Indications: SEVERE OCULAR INFLAMMATION       No Known Allergies   Lab Results   Component Value Date/Time    WBC 11.5 (H) 12/02/2022 10:01 AM    HGB 12.3 12/02/2022 10:01 AM    Hemoglobin (POC) 12.9 01/18/2011 08:35 AM    HCT 37.9 12/02/2022 10:01 AM    Hematocrit (POC) 38 01/18/2011 08:35 AM    PLATELET 592 06/81/0381 10:01 AM    MCV 77.8 (L) 12/02/2022 10:01 AM     Lab Results   Component Value Date/Time    Hemoglobin A1c 5.6 12/07/2015 09:20 AM    Glucose 97 12/02/2022 10:01 AM    Glucose (POC) 80 01/18/2011 08:35 AM    LDL, calculated 118 (H) 07/23/2018 11:25 AM    Creatinine (POC) 0.8 01/18/2011 08:35 AM    Creatinine 0.73 12/02/2022 10:01 AM      Lab Results   Component Value Date/Time    Cholesterol, total 209 (H) 07/23/2018 11:25 AM    HDL Cholesterol 76 07/23/2018 11:25 AM    LDL, calculated 118 (H) 07/23/2018 11:25 AM    Triglyceride 75 07/23/2018 11:25 AM     Lab Results   Component Value Date/Time    GFR est non-AA 84 07/23/2018 11:25 AM    GFRNA, POC >60 01/18/2011 08:35 AM    GFR est AA 97 07/23/2018 11:25 AM    GFRAA, POC >60 01/18/2011 08:35 AM    Creatinine 0.73 12/02/2022 10:01 AM    Creatinine (POC) 0.8 01/18/2011 08:35 AM    BUN 20 12/02/2022 10:01 AM    BUN (POC) 7 (L) 01/18/2011 08:35 AM    Sodium 142 12/02/2022 10:01 AM    Sodium (POC) 140 01/18/2011 08:35 AM    Potassium 2.9 (L) 12/02/2022 10:01 AM    Potassium (POC) 3.8 01/18/2011 08:35 AM    Chloride 106 12/02/2022 10:01 AM    Chloride (POC) 103 01/18/2011 08:35 AM    CO2 32 12/02/2022 10:01 AM     Lab Results   Component Value Date/Time    TSH 0.818 07/23/2018 11:25 AM ROS    Physical Exam  Vitals and nursing note reviewed. Constitutional:       Appearance: Normal appearance. She is well-developed. Comments: Appears stated age   Cardiovascular:      Rate and Rhythm: Normal rate and regular rhythm. Heart sounds: Normal heart sounds. No murmur heard. No friction rub. No gallop. Pulmonary:      Effort: Pulmonary effort is normal. No respiratory distress. Breath sounds: Normal breath sounds. No wheezing. Abdominal:      General: Bowel sounds are normal.      Palpations: Abdomen is soft. Musculoskeletal:      Right lower leg: Edema present. Left lower leg: Edema present. Neurological:      Mental Status: She is alert. Comments: Unsteady gait       ASSESSMENT and PLAN    ICD-10-CM ICD-9-CM    1. Hypertension, unspecified type  A27 401.7 METABOLIC PANEL, BASIC      LIPID PANEL      2. MS (multiple sclerosis) (HCC)  G35 340       3. Malignant neoplasm of colon, unspecified part of colon ([facility] [address]  C18.9 153.9       4. Hyperlipidemia, unspecified hyperlipidemia type  E78.5 272.4 LIPID PANEL      TSH 3RD GENERATION      5. Hypokalemia  L66.8 804.2 METABOLIC PANEL, BASIC      6. Abnormal EKG  R94.31 794.31       7. Fall, subsequent encounter  W19. XXXD V58.89      E888.9       8. Encounter for immunization  Z23 V03.89 INFLUENZA, FLUAD, (AGE 72 Y+), IM, PF, 0.5 ML      This is the Subsequent Medicare Annual Wellness Exam, performed 12 months or more after the Initial AWV or the last Subsequent AWV    I have reviewed the patient's medical history in detail and updated the computerized patient record. Assessment/Plan   Education and counseling provided:  Are appropriate based on today's review and evaluation  End-of-Life planning (with patient's consent)  Influenza Vaccine  Tdap and shingrix recommended    1. Hypertension, unspecified type  -     METABOLIC PANEL, BASIC; Future  -     LIPID PANEL;  Future   Controlled on low dose amlodipine and hctz-will d/c one med if experiences dizziness or another fall-d/w pt  2. MS (multiple sclerosis) ([facility] [address]   Stable-has fu neurologist [facility]  3. Malignant neoplasm of colon, unspecified part of colon ([facility] [address]   UTD colonoscopy  4. Hyperlipidemia, unspecified hyperlipidemia type  -     LIPID PANEL; Future  -     TSH 3RD GENERATION; Future   Consider statin  5. Hypokalemia  -     METABOLIC PANEL, BASIC; Future   Increase dietart KCL  6. Abnormal EKG-NST-no symtpoms  7. Fall, subsequent encounter  8. Encounter for immunization  -     INFLUENZA, FLUAD, (AGE 72 Y+), IM, PF, 0.5 ML       Depression Risk Factor Screening     3 most recent PHQ Screens 12/12/2022   Little interest or pleasure in doing things Not at all   Feeling down, depressed, irritable, or hopeless Not at all   Total Score PHQ 2 0       Alcohol & Drug Abuse Risk Screen    Do you average more than 1 drink per night or more than 7 drinks a week:  No    On any one occasion in the past three months have you have had more than 3 drinks containing alcohol:  No          Functional Ability and Level of Safety    Hearing: Hearing is good. Activities of Daily Living: The home contains: grab bars and shower chair  Patient does total self care      Ambulation: with difficulty, uses a walker     Fall Risk:  Fall Risk Assessment, last 12 mths 12/12/2022   Able to walk? Yes   Fall in past 12 months? 1   Do you feel unsteady? 1   Are you worried about falling 1   Is TUG test greater than 12 seconds? -   Is the gait abnormal? -   Number of falls in past 12 months 2   Fall with injury?  1      Abuse Screen:  Patient is not abused       Cognitive Screening    Has your family/caregiver stated any concerns about your memory: no     Cognitive Screening: Normal - serial 3    Health Maintenance Due     Health Maintenance Due   Topic Date Due    DTaP/Tdap/Td series (1 - Tdap) Never done    Shingrix Vaccine Age 50> (1 of 2) Never done    Breast Cancer Screen Mammogram  07/19/2018    Bone Densitometry (Dexa) Screening  Never done    Depression Screen  05/17/2022    Medicare Yearly Exam  05/18/2022    Flu Vaccine (1) 08/01/2022       Patient Care Team   Patient Care Team:  Emma Gibson MD as PCP - General  Emma Gibson MD as PCP - [facility]  Khushi Harley RN as [facility]  Nicolasa Hong MD as Consulting Provider (Neurology)  Lane Au NP as Nurse Practitioner (Nurse Practitioner)    History     Patient Active Problem List   Diagnosis Code    Fibroids D21.9    HTN (hypertension) I10    Colon cancer ([facility] [address] C18.9    MS (multiple sclerosis) ([facility] [address] G35    Abdominal wall mass of right lower quadrant R19.03    Ankle edema, bilateral--L>R M25.471, M25.472    S/P radiofrequency ablation operation for arrhythmia--7/16/16  Z98.890, Z86.79    Mixed dyslipidemia E78.2    BMI 28.0-28.9,adult Z68.28     Past Medical History:   Diagnosis Date    Arrhythmia     Essential hypertension     Hypertension     MS (multiple sclerosis) ([facility] [address]     Musculoskeletal disorder     Other ill-defined conditions(799.89)     multiple sclerosis    Unspecified adverse effect of anesthesia     given versed and lasted 24 hours      Past Surgical History:   Procedure Laterality Date    COLONOSCOPY  4/18/2011         COLONOSCOPY N/A 6/4/2020    COLONOSCOPY performed by Flower Granados MD at [facility] ENDOSCOPY    COLONOSCOPY,DIAGNOSTIC  6/4/2020         HX DILATION AND CURETTAGE      HX HERNIA REPAIR      left    HX HYSTERECTOMY  2011    HX OTHER SURGICAL      tendonitis repair    HX OTHER SURGICAL      cardiac ablation    HX OTHER SURGICAL      bilateral cataracts surgery    HX OTHER SURGICAL      left eye muscle surgery    HX TOTAL COLECTOMY  2006    villous adenoma    [place] CARDIAC SURG PROCEDURE UNLIST      coronary ablations     Current Outpatient Medications   Medication Sig Dispense Refill    amLODIPine (NORVASC) 2.5 mg tablet TAKE 1 TABLET BY MOUTH EVERY DAY 90 Tablet 0    hydroCHLOROthiazide (HYDRODIURIL) 25 mg tablet TAKE 1 TABLET BY MOUTH DAILY 90 Tablet 3    Mayzent 2 mg tab       Dalfampridine-ER 12 HR (AMPYRA) 10 mg tablet Take 10 mg by mouth two (2) times a day. cholecalciferol, vitamin D3, 50 mcg (2,000 unit) tab Take  by mouth.      estradioL (CLIMARA) 0.05 mg/24 hr 1 Patch by TransDERmal route Every Saturday. prednisoLONE acetate (PRED FORTE) 1 % ophthalmic suspension Administer 1 Drop to both eyes three (3) days a week.  Indications: SEVERE OCULAR INFLAMMATION       No Known Allergies    Family History   Problem Relation Age of Onset    Heart Disease Father     Diabetes Sister     Hypertension Sister     Diabetes Mother     Hypertension Mother     Elevated Lipids Mother      Social History     Tobacco Use    Smoking status: Never    Smokeless tobacco: Never   Substance Use Topics    Alcohol use: Yes     Comment: rarely         Ceasr Stoll MD

## 2022-12-12 ENCOUNTER — OFFICE VISIT (OUTPATIENT)
Dept: INTERNAL MEDICINE CLINIC | Age: 67
End: 2022-12-12
Payer: MEDICARE

## 2022-12-12 VITALS
RESPIRATION RATE: 16 BRPM | HEIGHT: 65 IN | DIASTOLIC BLOOD PRESSURE: 68 MMHG | WEIGHT: 168.6 LBS | OXYGEN SATURATION: 99 % | SYSTOLIC BLOOD PRESSURE: 120 MMHG | HEART RATE: 78 BPM | TEMPERATURE: 97.1 F | BODY MASS INDEX: 28.09 KG/M2

## 2022-12-12 DIAGNOSIS — G35 MS (MULTIPLE SCLEROSIS) (HCC): ICD-10-CM
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
## 2022-12-12 DIAGNOSIS — I10 HYPERTENSION, UNSPECIFIED TYPE: Primary | ICD-10-CM

## 2022-12-12 DIAGNOSIS — E87.6 HYPOKALEMIA: ICD-10-CM

## 2022-12-12 DIAGNOSIS — Z23 ENCOUNTER FOR IMMUNIZATION: ICD-10-CM

## 2022-12-12 DIAGNOSIS — R94.31 ABNORMAL EKG: ICD-10-CM

## 2022-12-12 DIAGNOSIS — E78.5 HYPERLIPIDEMIA, UNSPECIFIED HYPERLIPIDEMIA TYPE: ICD-10-CM

## 2022-12-12 DIAGNOSIS — C18.9 MALIGNANT NEOPLASM OF COLON, UNSPECIFIED PART OF COLON (HCC): ICD-10-CM

## 2022-12-12 PROCEDURE — 90694 VACC AIIV4 NO PRSRV 0.5ML IM: CPT | Performed by: INTERNAL MEDICINE

## 2022-12-12 NOTE — PROGRESS NOTES
1. Have you been to the ER, urgent care clinic since your last visit? Hospitalized since your last visit? ED [facility] 12/2/2022 for fall- patient states potassium levels low    2. Have you seen or consulted any other health care providers outside of the [address] [place] [address] since your last visit? Include any pap smears or colon screening.      Neurologist [address] [place] [address] - Dr. Delores Sterling

## 2022-12-12 NOTE — PATIENT INSTRUCTIONS
Medicare Wellness Visit, Female     The best way to live healthy is to have a lifestyle where you eat a well-balanced diet, exercise regularly, limit alcohol use, and quit all forms of tobacco/nicotine, if applicable. Regular preventive services are another way to keep healthy. Preventive services (vaccines, screening tests, monitoring & exams) can help personalize your care plan, which helps you manage your own care. Screening tests can find health problems at the earliest stages, when they are easiest to treat. [facility] follows the current, evidence-based guidelines published by the [facility] [employer] ([facility]) when recommending preventive services for our patients. Because we follow these guidelines, sometimes recommendations change over time as research supports it. (For example, mammograms used to be recommended annually. Even though Medicare will still pay for an annual mammogram, the newer guidelines recommend a mammogram every two years for women of average risk). Of course, you and your doctor may decide to screen more often for some diseases, based on your risk and your co-morbidities (chronic disease you are already diagnosed with). Preventive services for you include:  - Medicare offers their members a free annual wellness visit, which is time for you and your primary care provider to discuss and plan for your preventive service needs.  Take advantage of this benefit every year!    -Over the age of 72 should receive the recommended pneumonia vaccines.    -All adults should have a flu vaccine yearly.  -All adults should have a tetanus vaccine every 10 years.   -Over the age 48 should receive the shingles vaccines.        -All adults should be screened once for Hepatitis C.  -All adults age 38-68 who are overweight should have a diabetes screening test once every three years.   -Other screening tests and preventive services for persons with diabetes include: an eye exam to screen for diabetic retinopathy, a kidney function test, a foot exam, and stricter control over your cholesterol.   -Cardiovascular screening for adults with routine risk involves an electrocardiogram (ECG) at intervals determined by your doctor.     -Colorectal cancer screenings should be done for adults age 39-70 with no increased risk factors for colorectal cancer. There are a number of acceptable methods of screening for this type of cancer. Each test has its own benefits and drawbacks. Discuss with your doctor what is most appropriate for you during your annual wellness visit. The different tests include: colonoscopy (considered the best screening method), a fecal occult blood test, a fecal DNA test, and sigmoidoscopy.    -Lung cancer screening is recommended annually with a low dose CT scan for adults between age 54 and 68, who have smoked at least 30 pack years (equivalent of 1 pack per day for 30 days), and who is a current smoker or quit less than 15 years ago.    -A bone mass density test is recommended when a woman turns 65 to screen for osteoporosis. This test is only recommended one time, as a screening. Some providers will use this same test as a disease monitoring tool if you already have osteoporosis. -Breast cancer screenings are recommended every other year for women of normal risk, age 54-69.    -Cervical cancer screenings for women over age 72 are only recommended with certain risk factors.      Here is a list of your current Health Maintenance items (your personalized list of preventive services) with a due date:  Health Maintenance Due   Topic Date Due    DTaP/Tdap/Td  (1 - Tdap) Never done    Shingles Vaccine (1 of 2) Never done    Mammogram  07/19/2018    Bone Mineral Density   Never done    Depresssion Screening  05/17/2022    Annual Well Visit  05/18/2022    Yearly Flu Vaccine (1) 08/01/2022

## 2022-12-13 ENCOUNTER — LAB ONLY (OUTPATIENT)
Dept: INTERNAL MEDICINE CLINIC | Age: 67
End: 2022-12-13

## 2022-12-13 DIAGNOSIS — I10 HYPERTENSION, UNSPECIFIED TYPE: ICD-10-CM

## 2022-12-13 DIAGNOSIS — E78.5 HYPERLIPIDEMIA, UNSPECIFIED HYPERLIPIDEMIA TYPE: ICD-10-CM

## 2022-12-13 DIAGNOSIS — E87.6 HYPOKALEMIA: ICD-10-CM

## 2022-12-14 ENCOUNTER — TELEPHONE (OUTPATIENT)
Dept: INTERNAL MEDICINE CLINIC | Age: 67
End: 2022-12-14

## 2022-12-14 LAB
ANION GAP SERPL CALC-SCNC: 4 MMOL/L (ref 5–15)
BUN SERPL-MCNC: 15 MG/DL (ref 6–20)
BUN/CREAT SERPL: 19 (ref 12–20)
CALCIUM SERPL-MCNC: 9.5 MG/DL (ref 8.5–10.1)
CHLORIDE SERPL-SCNC: 105 MMOL/L (ref 97–108)
CHOLEST SERPL-MCNC: 220 MG/DL
CO2 SERPL-SCNC: 33 MMOL/L (ref 21–32)
CREAT SERPL-MCNC: 0.77 MG/DL (ref 0.55–1.02)
GLUCOSE SERPL-MCNC: 95 MG/DL (ref 65–100)
HDLC SERPL-MCNC: 84 MG/DL
HDLC SERPL: 2.6 {RATIO} (ref 0–5)
LDLC SERPL CALC-MCNC: 119.2 MG/DL (ref 0–100)
POTASSIUM SERPL-SCNC: 3.5 MMOL/L (ref 3.5–5.1)
SODIUM SERPL-SCNC: 142 MMOL/L (ref 136–145)
TRIGL SERPL-MCNC: 84 MG/DL (ref ?–150)
TSH SERPL DL<=0.05 MIU/L-ACNC: 1.33 UIU/ML (ref 0.36–3.74)
VLDLC SERPL CALC-MCNC: 16.8 MG/DL

## 2022-12-14 RX ORDER — ATORVASTATIN CALCIUM 10 MG/1
10 TABLET, FILM COATED ORAL DAILY
Qty: 90 TABLET | Refills: 1 | Status: SHIPPED | OUTPATIENT
Start: 2022-12-14

## 2022-12-14 NOTE — PROGRESS NOTES
Tell pt normal thyroid, glucose kidney gail  Has mild LDL cholesterol elevation --due to her age and HTN would be recommended to get LDL < 100--I can send in lipitor 10 mg every day if she wants to start med-let me know

## 2022-12-14 NOTE — TELEPHONE ENCOUNTER
Spoke with patient using two identifiers. Advised patient of lab results. Patient will like medication to be sent into pharmacy. Advised patient on diet to lower LDL. Patient verbalized understanding.        Per Dr. Gina Persaud pt normal thyroid, glucose kidney gail   Has mild LDL cholesterol elevation --due to her age and HTN would be recommended to get LDL < 100--I can send in lipitor 10 mg every day if she wants to start med-let me know \"

## 2023-06-05 RX ORDER — ATORVASTATIN CALCIUM 10 MG/1
TABLET, FILM COATED ORAL
Qty: 90 TABLET | Refills: 1 | Status: SHIPPED | OUTPATIENT
Start: 2023-06-05

## 2023-07-31 RX ORDER — HYDROCHLOROTHIAZIDE 25 MG/1
TABLET ORAL
Qty: 90 TABLET | Refills: 3 | Status: SHIPPED | OUTPATIENT
Start: 2023-07-31

## 2023-11-30 RX ORDER — ATORVASTATIN CALCIUM 10 MG/1
TABLET, FILM COATED ORAL
Qty: 90 TABLET | Refills: 1 | Status: SHIPPED | OUTPATIENT
Start: 2023-11-30

## 2024-03-13 RX ORDER — AMLODIPINE BESYLATE 2.5 MG/1
TABLET ORAL
Qty: 90 TABLET | Refills: 3 | Status: SHIPPED | OUTPATIENT
Start: 2024-03-13

## 2024-04-01 RX ORDER — ATORVASTATIN CALCIUM 10 MG/1
TABLET, FILM COATED ORAL
Qty: 90 TABLET | Refills: 1 | Status: SHIPPED | OUTPATIENT
Start: 2024-04-01

## 2024-07-29 RX ORDER — HYDROCHLOROTHIAZIDE 25 MG/1
TABLET ORAL
Qty: 90 TABLET | Refills: 3 | Status: SHIPPED | OUTPATIENT
Start: 2024-07-29

## 2024-08-19 ENCOUNTER — TRANSCRIBE ORDERS (OUTPATIENT)
Facility: HOSPITAL | Age: 69
End: 2024-08-19

## 2024-08-19 DIAGNOSIS — I89.0 LYMPHEDEMA: Primary | ICD-10-CM

## 2024-10-23 RX ORDER — ATORVASTATIN CALCIUM 10 MG/1
TABLET, FILM COATED ORAL
Qty: 90 TABLET | Refills: 1 | Status: SHIPPED | OUTPATIENT
Start: 2024-10-23

## 2024-11-25 RX ORDER — AMLODIPINE BESYLATE 2.5 MG/1
TABLET ORAL
Qty: 90 TABLET | Refills: 3 | Status: SHIPPED | OUTPATIENT
Start: 2024-11-25

## 2025-05-02 RX ORDER — ATORVASTATIN CALCIUM 10 MG/1
10 TABLET, FILM COATED ORAL DAILY
Qty: 90 TABLET | Refills: 1 | Status: SHIPPED | OUTPATIENT
Start: 2025-05-02

## 2025-05-07 ENCOUNTER — TELEPHONE (OUTPATIENT)
Age: 70
End: 2025-05-07

## 2025-05-07 NOTE — TELEPHONE ENCOUNTER
[facility]  Breast Navigator Encounter    Name:    Carlito Alonzo  Age:    69 y.o.  Diagnosis: DCIS    Interdisciplinary Team:  Med-Onc:      Surg-Onc:  Khurram    Rad-Onc:      Plastics:      :      Nurse Navigator:  Andreia VELAZQUEZ-RN      Encounter type:  []Patient Initiated  []Navigator Follow-up []Pre-op  []Post-op  []Check-in Prior to First Treatment []Treatment Modality Change  [x]Initial Navigator Encounter []Other:       Narrative:      LVM for patient with name, role and contact information to call back. Let her know I am trying to get her scheduled to come in and see Dr. Paulson for follow up. Receptors are still pending. Let them know I am here to support and answer any questions about upcoming/recent surgery/doctors appointment. Let them know to call back when they are able and have free time to discuss everything.                   Referrals/Handouts:            Andreia VELAZQUEZ, RN.  Breast Cancer Navigator   [address]  [address]  Office: [phone]  Cell: [phone]  F: [phone]  [email or web address]  Good Help to Those in Need®

## 2025-05-08 ENCOUNTER — TELEPHONE (OUTPATIENT)
Age: 70
End: 2025-05-08

## 2025-05-08 NOTE — TELEPHONE ENCOUNTER
[facility]  Breast Navigator Encounter    Name:    Carlito Alonzo  Age:    69 y.o.  Diagnosis: DCIS    Interdisciplinary Team:  Med-Onc:      Surg-Onc:  Khurram    Rad-Onc:      Plastics:      :      Nurse Navigator:  Andreia VELAZQUEZ-RN      Encounter type:  []Patient Initiated  []Navigator Follow-up []Pre-op  []Post-op  []Check-in Prior to First Treatment []Treatment Modality Change  [x]Initial Navigator Encounter []Other:       Narrative:    Reached out to patient for initial navigator contact. I was able to get her scheduled for Monday 5/12 at 9:20am. I explained what happens at the first consultation - an exam by the physician, a possible ultrasound, then complete discussion of surgical options and other treatment that may be considered.  I encouraged the patient to bring someone with her to this appointment. She has not told anyone yet and thinks she will wait until after she has her appointment to share information. I did send her a text with the appointment day, time and address so she had it in multiple places. I let her know if we need any additional imaging, I will help get that set up for her after we talk to Dr. Paulson. She was appreciative of the call and has my name and number in her phone.                     Referrals/Handouts:            Andreia VELAZQUEZ, RN.  Breast Cancer Navigator   [address]  [address]  Office: [phone]  Cell: [phone]  F: [phone]  [email or web address]  Good Help to Those in Need®

## 2025-05-12 ENCOUNTER — TELEPHONE (OUTPATIENT)
Facility: HOSPITAL | Age: 70
End: 2025-05-12

## 2025-05-12 ENCOUNTER — OFFICE VISIT (OUTPATIENT)
Age: 70
End: 2025-05-12
Payer: MEDICARE

## 2025-05-12 VITALS
WEIGHT: 173 LBS | HEART RATE: 72 BPM | TEMPERATURE: 97.5 F | BODY MASS INDEX: 28.82 KG/M2 | SYSTOLIC BLOOD PRESSURE: 136 MMHG | OXYGEN SATURATION: 98 % | RESPIRATION RATE: 18 BRPM | HEIGHT: 65 IN | DIASTOLIC BLOOD PRESSURE: 80 MMHG

## 2025-05-12 DIAGNOSIS — D05.12 BREAST NEOPLASM, TIS (DCIS), LEFT: Primary | ICD-10-CM

## 2025-05-12 PROCEDURE — 99205 OFFICE O/P NEW HI 60 MIN: CPT | Performed by: SURGERY

## 2025-05-12 PROCEDURE — 3017F COLORECTAL CA SCREEN DOC REV: CPT | Performed by: SURGERY

## 2025-05-12 PROCEDURE — 1090F PRES/ABSN URINE INCON ASSESS: CPT | Performed by: SURGERY

## 2025-05-12 PROCEDURE — 1036F TOBACCO NON-USER: CPT | Performed by: SURGERY

## 2025-05-12 PROCEDURE — 3079F DIAST BP 80-89 MM HG: CPT | Performed by: SURGERY

## 2025-05-12 PROCEDURE — 1159F MED LIST DOCD IN RCRD: CPT | Performed by: SURGERY

## 2025-05-12 PROCEDURE — 1126F AMNT PAIN NOTED NONE PRSNT: CPT | Performed by: SURGERY

## 2025-05-12 PROCEDURE — 3075F SYST BP GE 130 - 139MM HG: CPT | Performed by: SURGERY

## 2025-05-12 PROCEDURE — G8419 CALC BMI OUT NRM PARAM NOF/U: HCPCS | Performed by: SURGERY

## 2025-05-12 PROCEDURE — 1123F ACP DISCUSS/DSCN MKR DOCD: CPT | Performed by: SURGERY

## 2025-05-12 PROCEDURE — G8399 PT W/DXA RESULTS DOCUMENT: HCPCS | Performed by: SURGERY

## 2025-05-12 PROCEDURE — G8427 DOCREV CUR MEDS BY ELIG CLIN: HCPCS | Performed by: SURGERY

## 2025-05-12 PROCEDURE — 1160F RVW MEDS BY RX/DR IN RCRD: CPT | Performed by: SURGERY

## 2025-05-12 RX ORDER — CYCLOBENZAPRINE HCL 5 MG
5 TABLET ORAL 3 TIMES DAILY PRN
COMMUNITY

## 2025-05-12 ASSESSMENT — ENCOUNTER SYMPTOMS
ABDOMINAL PAIN: 0
SHORTNESS OF BREATH: 0
STRIDOR: 0
SORE THROAT: 0
COUGH: 0
EYE PAIN: 0
NAUSEA: 0
DIARRHEA: 0
CONSTIPATION: 0
BACK PAIN: 0
WHEEZING: 0
BLOOD IN STOOL: 0
VOMITING: 0

## 2025-05-12 ASSESSMENT — PATIENT HEALTH QUESTIONNAIRE - PHQ9
SUM OF ALL RESPONSES TO PHQ QUESTIONS 1-9: 0
SUM OF ALL RESPONSES TO PHQ QUESTIONS 1-9: 0
1. LITTLE INTEREST OR PLEASURE IN DOING THINGS: NOT AT ALL
SUM OF ALL RESPONSES TO PHQ QUESTIONS 1-9: 0
SUM OF ALL RESPONSES TO PHQ QUESTIONS 1-9: 0
2. FEELING DOWN, DEPRESSED OR HOPELESS: NOT AT ALL

## 2025-05-12 NOTE — TELEPHONE ENCOUNTER
[facility]  Breast Navigator Encounter    Name:    Carlito Alonzo  Age:    69 y.o.  Diagnosis: DCIS    Narrative: I met with Mrs. Alonzo during her visit with Dr. Paulson, she opted to have an MRI and genetic testing done. Her labs were drawn in the office today and her MRI is set for 5/21/25 at 1:45pm at [facility]. I told her I would call her this afternoon with instructions and also to go over her appointment today and assess any questions she has at that time.             Andreia VELAZQUEZ-RN  Breast Cancer Navigator   [facility]  [address] [place]  [address]  [email or web address]  Good Help to Those in Need®

## 2025-05-12 NOTE — PROGRESS NOTES
Subjective:      Patient ID: Carlito Alonzo is a 69 y.o. female who comes in for consultation by  Galen Gustafson MD and Dr SHIV Caldera      Chief Complaint   Patient presents with    New Patient     Breast DCIS       HPI    She underwent routine screening mammogram and was noted to have a clustering of microcalcifications at 0800 posterior in the left breast.  Stereotactic biopsy 5/1/2025 demonstrated DICS intermediate grade ER 0 CT 0.   She denies feeling any lumps, skin changes, nipple changes and or drainage, unexplained weight loss or bone pain.  She had menarche at 11, only pregnancy at 18, and took HRT x 19 years.   She denies family hx breast, ovarian, prostate, pancreatic, colon cancer.        Past Medical History:   Diagnosis Date    Arrhythmia     Essential hypertension     Hypertension     MS (multiple sclerosis) (HCC)     Musculoskeletal disorder     Other ill-defined conditions(799.89)     multiple sclerosis    Unspecified adverse effect of anesthesia     given versed and lasted 24 hours     Past Surgical History:   Procedure Laterality Date    COLONOSCOPY N/A 6/4/2020    COLONOSCOPY performed by Elton Dorsey MD at \[facility]"" ENDOSCOPY    COLONOSCOPY  4/18/2011         COLONOSCOPY,DIAGNOSTIC  6/4/2020         DILATION AND CURETTAGE OF UTERUS      HERNIA REPAIR      left    HYSTERECTOMY (CERVIX STATUS UNKNOWN)  2011    OTHER SURGICAL HISTORY      tendonitis repair    OTHER SURGICAL HISTORY      cardiac ablation    OTHER SURGICAL HISTORY      bilateral cataracts surgery    OTHER SURGICAL HISTORY      left eye muscle surgery    CT UNLISTED PROCEDURE CARDIAC SURGERY      coronary ablations    TOTAL COLECTOMY  2006    villous adenoma     Family History   Problem Relation Age of Onset    Hypertension Mother     Heart Disease Father     Elevated Lipids Mother     Diabetes Sister     Hypertension Sister     Diabetes Mother      Social History     Tobacco Use    Smoking status: Never    Smokeless tobacco: Never   Substance

## 2025-05-12 NOTE — PROGRESS NOTES
Identified pt with two pt identifiers (name and ). Reviewed chart in preparation for visit and have obtained necessary documentation.    Carlito Alonzo is a 69 y.o. female New Patient (Breast DCIS)  .    Vitals:    25 0932   BP: 136/80   BP Site: Left Upper Arm   Patient Position: Sitting   BP Cuff Size: Medium Adult   Pulse: 72   Resp: 18   Temp: 97.5 °F (36.4 °C)   TempSrc: Oral   SpO2: 98%   Weight: 78.5 kg (173 lb)   Height: 1.651 m (5' 5\")          1. Have you been to the ER, urgent care clinic since your last visit?  Hospitalized since your last visit?  no     2. Have you seen or consulted any other health care providers outside of the [facility] since your last visit?  Include any pap smears or colon screening.  yes - [facility] 25      Lymphedema Measurements:      Lymphedema Measurements:        [x]Right Arm  []Left Arm     Wrist:      __16.5_____cm   (Measurement taken at the bend of the Wrist)     Forearm: ___24.0____cm    (Measurement taken ___6___ cm from the Wrist)     Bicep:     _____31.0___cm   (Measurement taken ___6____cm from the AC)               []Right Arm  [x]Left Arm     Wrist:      __16.5_____cm   (Measurement taken at the bend of the Wrist)     Forearm: ____24.0___cm    (Measurement taken ___6___ cm from the Wrist)     Bicep:     ____31.0____cm   (Measurement taken ___6____cm from the AC)

## 2025-05-13 ENCOUNTER — TELEPHONE (OUTPATIENT)
Facility: HOSPITAL | Age: 70
End: 2025-05-13

## 2025-05-13 NOTE — TELEPHONE ENCOUNTER
[facility]  Breast Navigator Encounter    Name:    Carlito Alonzo  Age:    69 y.o.  Diagnosis: breast ca    Narrative: I spoke with Carlito today and she is scheduled for her breast MRI 5/21 at [facility] 1:45pm. We went over all instructions, address and what to expect. I told her Dr. Paulson will call her with the results and as long as the imaging looks as expected, we will go ahead and schedule surgery. She has no questions at this time. She has a monthly fellowship meeting with [employer] she attended earlier today where they get together and play games. She is going to host one of them at her house soon. She has not told any of her family at this time, she wanted to wait and get all of the information. She will let them know around memorial day and then secure her ride. Her sister works in the recovery room at [facility] [employer].             Andreia VELAZQUEZ-RN  Breast Cancer Navigator   [address]  [address]  [email or web address]  Good Help to Those in Need®

## 2025-05-21 ENCOUNTER — HOSPITAL ENCOUNTER (OUTPATIENT)
Facility: HOSPITAL | Age: 70
Discharge: HOME | End: 2025-05-24
Attending: SURGERY
Payer: MEDICARE

## 2025-05-21 DIAGNOSIS — D05.12 BREAST NEOPLASM, TIS (DCIS), LEFT: ICD-10-CM

## 2025-05-21 PROCEDURE — A9575 INJ GADOTERATE MEGLUMI 0.1ML: HCPCS | Performed by: SURGERY

## 2025-05-21 PROCEDURE — 6360000004 HC RX CONTRAST MEDICATION: Performed by: SURGERY

## 2025-05-21 PROCEDURE — C8908 MRI W/O FOL W/CONT, BREAST,: HCPCS

## 2025-05-21 RX ADMIN — GADOTERATE MEGLUMINE 15 ML: 376.9 INJECTION INTRAVENOUS at 15:19

## 2025-05-24 ENCOUNTER — RESULTS FOLLOW-UP (OUTPATIENT)
Age: 70
End: 2025-05-24

## 2025-05-24 NOTE — TELEPHONE ENCOUNTER
MRI demonstrates only the small area of DCIS     Awaiting genetic testing      Hopefully can undergo lumpectomy only      Left message      Gio Paulson MD FACS

## 2025-05-31 ENCOUNTER — TELEPHONE (OUTPATIENT)
Age: 70
End: 2025-05-31

## 2025-05-31 NOTE — TELEPHONE ENCOUNTER
[facility] genetics    NEGATIVE for pathogenic mutation,  VUS, or gross deletion/duplication    She desires breast conservation    She desires a  left breast lumpectomy with needle localization under general anesthesia as an outpatient    She wants to wait until early July which I think is fine    Gio Paulson MD FACS

## 2025-06-03 ENCOUNTER — PREP FOR PROCEDURE (OUTPATIENT)
Age: 70
End: 2025-06-03

## 2025-06-03 DIAGNOSIS — D05.12 DUCTAL CARCINOMA IN SITU (DCIS) OF LEFT BREAST: Primary | ICD-10-CM

## 2025-06-03 DIAGNOSIS — D05.12 BREAST NEOPLASM, TIS (DCIS), LEFT: Primary | ICD-10-CM

## 2025-06-03 DIAGNOSIS — D05.12 DUCTAL CARCINOMA IN SITU (DCIS) OF LEFT BREAST: ICD-10-CM

## 2025-06-13 ENCOUNTER — TELEPHONE (OUTPATIENT)
Age: 70
End: 2025-06-13

## 2025-06-13 NOTE — TELEPHONE ENCOUNTER
[facility]  Breast Navigator Encounter    Name:    Carlito Alonzo  Age:    69 y.o.  Diagnosis: breast ca    Narrative: I spoke with Carlito and she had a few questions she wanted to ask. She wanted to talk about the type of bra she should wear after this and we discussed finding one that is comfortable but offers good support too. This will help decrease swelling and help her heal faster if everything is being held in place appropriately. We discussed wearing it at night too if she can tolerate it. We also talked about radiation and that we would get her in to see them post operatively once Dr. Paulson sees her back in the office. Reminded her to reach back out with any questions or concerns.             Andreia GUTIERREZN-RN  Breast Cancer Navigator   [facility]  [address] [place] [address]  [id number]  [email or web address]  Good Help to Those in Need®

## 2025-06-27 NOTE — PROGRESS NOTES
[facility]  Preoperative Instructions        Surgery Date 7/10/2025   Time of Arrival to be called between 2pm - 5pm the day before your surgery.  Contact# [phone]    On the day of your surgery, please report to Surgical Services Registration Desk and sign in at your designated time.  The Surgery Center is located to the right of the Emergency Room.     2. You must have someone with you to drive you home. You should not drive a car for 24 hours following surgery. Please make arrangements for a friend or family member to stay with you for the first 24 hours after your surgery.    3. Do not have anything to eat or drink (including water, gum, mints, coffee, juice) after midnight ??  7/9/2025   .?This may not apply to medications prescribed by your physician. ?(Please note below the special instructions with medications to take the morning of your procedure.)    4. We recommend you do not drink any alcoholic beverages for 24 hours before and after your surgery.    5. Contact your surgeon's office for instructions on the following medications: non-steroidal anti-inflammatory drugs (i.e. Advil, Aleve), vitamins, and supplements. (Some surgeon's will want you to stop these medications prior to surgery and others may allow you to take them)  **If you are currently taking Plavix, Coumadin, Aspirin and/or other blood-thinning agents, contact your surgeon for instructions.** Your surgeon will partner with the physician prescribing these medications to determine if it is safe to stop or if you need to continue taking.  Please do not stop taking these medications without instructions from your surgeon    6. Wear comfortable clothes.  Wear glasses instead of contacts.  Do not bring any money or jewelry. Please bring picture ID, insurance card, and any prearranged co-payment or hospital payment.  Do not wear make-up, particularly mascara the morning of your surgery.  Do not wear nail polish,  particularly if you are having foot /hand surgery.  Wear your hair loose or down, no ponytails, buns, helga pins or clips.  All body piercings must be removed. Please do not shave for 48 hours prior to surgery. Shaving of the face is acceptable. Please see the attached Soap/Hibiclens bathing instructions.    7. You should understand that if you do not follow these instructions your surgery may be cancelled.  If your physical condition changes (I.e. fever, cold or flu) please contact your surgeon as soon as possible.    8. It is important that you be on time.  If a situation occurs where you may be late, please call [phone] (OR Holding Area).    9. If you have any questions and or problems, please call [phone] (Pre-admission Testing).    10. Your surgery time may be subject to change.  You will receive a phone call the evening prior if your time changes.    11.  If having outpatient surgery, you must have someone to drive you here, stay with you during the duration of your stay, and to drive you home at time of discharge.       SPECIAL INSTRUCTIONS: Follow all instructions Dr. Paulson has given you.    TAKE ALL MEDICATIONS DAY OF SURGERY EXCEPT: vitamins or supplements      I understand a pre-operative phone call will be made to verify my surgery time.  In the event that I am not available, I give permission for a message to be left on my answering service and/or with another person?  yes         ___________________      __________   _________    (Signature of Patient)             (Witness)                (Date and Time)

## 2025-06-30 NOTE — PROGRESS NOTES
Call to patient regarding 5/21/25 K+ level 3.1 - patient states she has not been treated for that nor has it been addressed.   Pt will call her PCP to see if they want to treat and will call PAT back.   Recommended to patient to eat foods rich in potassium. Stated understanding no further questions.

## 2025-07-01 ENCOUNTER — TELEPHONE (OUTPATIENT)
Facility: HOSPITAL | Age: 70
End: 2025-07-01

## 2025-07-01 NOTE — PROGRESS NOTES
Returned call to patient and she states she has notified her PCP and she states that her PCP will send in a prescription and she states she has bought her some bananas to help increase her potassium level. Will recheck the day of surgery.

## 2025-07-07 ENCOUNTER — HOSPITAL ENCOUNTER (OUTPATIENT)
Facility: HOSPITAL | Age: 70
Discharge: HOME | End: 2025-07-10
Attending: SURGERY

## 2025-07-07 DIAGNOSIS — D05.12 BREAST NEOPLASM, TIS (DCIS), LEFT: ICD-10-CM

## 2025-07-07 RX ORDER — LIDOCAINE HYDROCHLORIDE 10 MG/ML
5 INJECTION, SOLUTION EPIDURAL; INFILTRATION; INTRACAUDAL; PERINEURAL ONCE
Status: DISCONTINUED | OUTPATIENT
Start: 2025-07-07 | End: 2025-07-11 | Stop reason: HOSPADM

## 2025-07-10 ENCOUNTER — APPOINTMENT (OUTPATIENT)
Facility: HOSPITAL | Age: 70
End: 2025-07-10
Attending: SURGERY
Payer: MEDICARE

## 2025-07-10 ENCOUNTER — HOSPITAL ENCOUNTER (OUTPATIENT)
Facility: HOSPITAL | Age: 70
Setting detail: OUTPATIENT SURGERY
Discharge: HOME | End: 2025-07-10
Attending: SURGERY | Admitting: SURGERY
Payer: MEDICARE

## 2025-07-10 ENCOUNTER — ANESTHESIA EVENT (OUTPATIENT)
Facility: HOSPITAL | Age: 70
End: 2025-07-10
Payer: MEDICARE

## 2025-07-10 ENCOUNTER — ANESTHESIA (OUTPATIENT)
Facility: HOSPITAL | Age: 70
End: 2025-07-10
Payer: MEDICARE

## 2025-07-10 VITALS
SYSTOLIC BLOOD PRESSURE: 168 MMHG | DIASTOLIC BLOOD PRESSURE: 82 MMHG | WEIGHT: 172.18 LBS | RESPIRATION RATE: 28 BRPM | TEMPERATURE: 98.7 F | OXYGEN SATURATION: 94 % | HEIGHT: 65 IN | BODY MASS INDEX: 28.69 KG/M2 | HEART RATE: 82 BPM

## 2025-07-10 DIAGNOSIS — D05.12 BREAST NEOPLASM, TIS (DCIS), LEFT: ICD-10-CM

## 2025-07-10 DIAGNOSIS — D05.12 DUCTAL CARCINOMA IN SITU (DCIS) OF LEFT BREAST: Primary | ICD-10-CM

## 2025-07-10 PROCEDURE — 6360000002 HC RX W HCPCS: Performed by: SURGERY

## 2025-07-10 PROCEDURE — 3600000013 HC SURGERY LEVEL 3 ADDTL 15MIN: Performed by: SURGERY

## 2025-07-10 PROCEDURE — 6360000002 HC RX W HCPCS: Performed by: RADIOLOGY

## 2025-07-10 PROCEDURE — 7100000001 HC PACU RECOVERY - ADDTL 15 MIN: Performed by: SURGERY

## 2025-07-10 PROCEDURE — 88307 TISSUE EXAM BY PATHOLOGIST: CPT

## 2025-07-10 PROCEDURE — C1819 TISSUE LOCALIZATION-EXCISION: HCPCS

## 2025-07-10 PROCEDURE — 6360000002 HC RX W HCPCS: Performed by: NURSE ANESTHETIST, CERTIFIED REGISTERED

## 2025-07-10 PROCEDURE — 19301 PARTIAL MASTECTOMY: CPT | Performed by: SURGERY

## 2025-07-10 PROCEDURE — 2709999900 HC NON-CHARGEABLE SUPPLY: Performed by: SURGERY

## 2025-07-10 PROCEDURE — 7100000000 HC PACU RECOVERY - FIRST 15 MIN: Performed by: SURGERY

## 2025-07-10 PROCEDURE — 3600000003 HC SURGERY LEVEL 3 BASE: Performed by: SURGERY

## 2025-07-10 PROCEDURE — 2500000003 HC RX 250 WO HCPCS: Performed by: NURSE ANESTHETIST, CERTIFIED REGISTERED

## 2025-07-10 PROCEDURE — 2580000003 HC RX 258: Performed by: ANESTHESIOLOGY

## 2025-07-10 PROCEDURE — 3700000000 HC ANESTHESIA ATTENDED CARE: Performed by: SURGERY

## 2025-07-10 PROCEDURE — 76098 X-RAY EXAM SURGICAL SPECIMEN: CPT

## 2025-07-10 PROCEDURE — 7100000011 HC PHASE II RECOVERY - ADDTL 15 MIN: Performed by: SURGERY

## 2025-07-10 PROCEDURE — 7100000010 HC PHASE II RECOVERY - FIRST 15 MIN: Performed by: SURGERY

## 2025-07-10 PROCEDURE — 3700000001 HC ADD 15 MINUTES (ANESTHESIA): Performed by: SURGERY

## 2025-07-10 RX ORDER — OXYCODONE HYDROCHLORIDE 5 MG/1
5 TABLET ORAL EVERY 6 HOURS PRN
Qty: 12 TABLET | Refills: 0 | Status: SHIPPED | OUTPATIENT
Start: 2025-07-10 | End: 2025-07-13

## 2025-07-10 RX ORDER — LIDOCAINE HYDROCHLORIDE 10 MG/ML
5 INJECTION, SOLUTION EPIDURAL; INFILTRATION; INTRACAUDAL; PERINEURAL ONCE
Status: COMPLETED | OUTPATIENT
Start: 2025-07-10 | End: 2025-07-10

## 2025-07-10 RX ORDER — SODIUM CHLORIDE 0.9 % (FLUSH) 0.9 %
5-40 SYRINGE (ML) INJECTION PRN
Status: DISCONTINUED | OUTPATIENT
Start: 2025-07-10 | End: 2025-07-10 | Stop reason: HOSPADM

## 2025-07-10 RX ORDER — LIDOCAINE HYDROCHLORIDE 20 MG/ML
INJECTION, SOLUTION EPIDURAL; INFILTRATION; INTRACAUDAL; PERINEURAL
Status: DISCONTINUED | OUTPATIENT
Start: 2025-07-10 | End: 2025-07-10 | Stop reason: SDUPTHER

## 2025-07-10 RX ORDER — FENTANYL CITRATE 50 UG/ML
INJECTION, SOLUTION INTRAMUSCULAR; INTRAVENOUS
Status: DISCONTINUED | OUTPATIENT
Start: 2025-07-10 | End: 2025-07-10 | Stop reason: SDUPTHER

## 2025-07-10 RX ORDER — SODIUM CHLORIDE, SODIUM LACTATE, POTASSIUM CHLORIDE, CALCIUM CHLORIDE 600; 310; 30; 20 MG/100ML; MG/100ML; MG/100ML; MG/100ML
INJECTION, SOLUTION INTRAVENOUS ONCE
Status: COMPLETED | OUTPATIENT
Start: 2025-07-10 | End: 2025-07-10

## 2025-07-10 RX ORDER — PHENYLEPHRINE HCL IN 0.9% NACL 0.4MG/10ML
SYRINGE (ML) INTRAVENOUS
Status: DISCONTINUED | OUTPATIENT
Start: 2025-07-10 | End: 2025-07-10 | Stop reason: SDUPTHER

## 2025-07-10 RX ORDER — SODIUM CHLORIDE 0.9 % (FLUSH) 0.9 %
5-40 SYRINGE (ML) INJECTION EVERY 12 HOURS SCHEDULED
Status: DISCONTINUED | OUTPATIENT
Start: 2025-07-10 | End: 2025-07-10 | Stop reason: HOSPADM

## 2025-07-10 RX ORDER — FENTANYL CITRATE 50 UG/ML
25 INJECTION, SOLUTION INTRAMUSCULAR; INTRAVENOUS EVERY 5 MIN PRN
Status: DISCONTINUED | OUTPATIENT
Start: 2025-07-10 | End: 2025-07-10 | Stop reason: HOSPADM

## 2025-07-10 RX ORDER — EPHEDRINE SULFATE/0.9% NACL/PF 50 MG/5 ML
SYRINGE (ML) INTRAVENOUS
Status: DISCONTINUED | OUTPATIENT
Start: 2025-07-10 | End: 2025-07-10 | Stop reason: SDUPTHER

## 2025-07-10 RX ORDER — CEFAZOLIN SODIUM 1 G/3ML
INJECTION, POWDER, FOR SOLUTION INTRAMUSCULAR; INTRAVENOUS
Status: DISCONTINUED | OUTPATIENT
Start: 2025-07-10 | End: 2025-07-10 | Stop reason: SDUPTHER

## 2025-07-10 RX ORDER — OXYCODONE HYDROCHLORIDE 5 MG/1
5 TABLET ORAL
Status: DISCONTINUED | OUTPATIENT
Start: 2025-07-10 | End: 2025-07-10 | Stop reason: HOSPADM

## 2025-07-10 RX ORDER — HYDROMORPHONE HYDROCHLORIDE 1 MG/ML
0.5 INJECTION, SOLUTION INTRAMUSCULAR; INTRAVENOUS; SUBCUTANEOUS EVERY 5 MIN PRN
Status: DISCONTINUED | OUTPATIENT
Start: 2025-07-10 | End: 2025-07-10 | Stop reason: HOSPADM

## 2025-07-10 RX ORDER — DEXAMETHASONE SODIUM PHOSPHATE 4 MG/ML
INJECTION, SOLUTION INTRA-ARTICULAR; INTRALESIONAL; INTRAMUSCULAR; INTRAVENOUS; SOFT TISSUE
Status: DISCONTINUED | OUTPATIENT
Start: 2025-07-10 | End: 2025-07-10 | Stop reason: SDUPTHER

## 2025-07-10 RX ORDER — ONDANSETRON 2 MG/ML
4 INJECTION INTRAMUSCULAR; INTRAVENOUS
Status: DISCONTINUED | OUTPATIENT
Start: 2025-07-10 | End: 2025-07-10 | Stop reason: HOSPADM

## 2025-07-10 RX ORDER — PROPOFOL 10 MG/ML
INJECTION, EMULSION INTRAVENOUS
Status: DISCONTINUED | OUTPATIENT
Start: 2025-07-10 | End: 2025-07-10 | Stop reason: SDUPTHER

## 2025-07-10 RX ORDER — ONDANSETRON 2 MG/ML
INJECTION INTRAMUSCULAR; INTRAVENOUS
Status: DISCONTINUED | OUTPATIENT
Start: 2025-07-10 | End: 2025-07-10 | Stop reason: SDUPTHER

## 2025-07-10 RX ADMIN — Medication 80 MCG: at 11:37

## 2025-07-10 RX ADMIN — SODIUM CHLORIDE, POTASSIUM CHLORIDE, SODIUM LACTATE AND CALCIUM CHLORIDE: 600; 310; 30; 20 INJECTION, SOLUTION INTRAVENOUS at 10:52

## 2025-07-10 RX ADMIN — LIDOCAINE HYDROCHLORIDE 5 ML: 10 INJECTION, SOLUTION EPIDURAL; INFILTRATION; INTRACAUDAL; PERINEURAL at 10:24

## 2025-07-10 RX ADMIN — LIDOCAINE HYDROCHLORIDE 100 MG: 20 INJECTION, SOLUTION EPIDURAL; INFILTRATION; INTRACAUDAL; PERINEURAL at 11:04

## 2025-07-10 RX ADMIN — Medication 80 MCG: at 11:43

## 2025-07-10 RX ADMIN — FENTANYL CITRATE 50 MCG: 50 INJECTION, SOLUTION INTRAMUSCULAR; INTRAVENOUS at 11:25

## 2025-07-10 RX ADMIN — PROPOFOL 150 MG: 10 INJECTION, EMULSION INTRAVENOUS at 11:04

## 2025-07-10 RX ADMIN — METHYLENE BLUE 3 ML: 5 INJECTION INTRAVENOUS at 10:23

## 2025-07-10 RX ADMIN — FENTANYL CITRATE 50 MCG: 50 INJECTION, SOLUTION INTRAMUSCULAR; INTRAVENOUS at 10:57

## 2025-07-10 RX ADMIN — Medication 10 MG: at 11:17

## 2025-07-10 RX ADMIN — ONDANSETRON HYDROCHLORIDE 4 MG: 2 INJECTION, SOLUTION INTRAMUSCULAR; INTRAVENOUS at 11:26

## 2025-07-10 RX ADMIN — CEFAZOLIN 2 G: 1 INJECTION, POWDER, FOR SOLUTION INTRAMUSCULAR; INTRAVENOUS; PARENTERAL at 11:07

## 2025-07-10 RX ADMIN — Medication 40 MCG: at 11:31

## 2025-07-10 RX ADMIN — Medication 120 MCG: at 11:16

## 2025-07-10 RX ADMIN — Medication 80 MCG: at 11:11

## 2025-07-10 RX ADMIN — DEXAMETHASONE SODIUM PHOSPHATE 4 MG: 4 INJECTION, SOLUTION INTRAMUSCULAR; INTRAVENOUS at 11:26

## 2025-07-10 NOTE — H&P
Subjective:      Patient ID: Carlito Alonzo is a 69 y.o. female who comes in for consultation by  Galen Gustafson MD and Dr SHIV Caldera             Chief Complaint   Patient presents with    New Patient       Breast DCIS         HPI     She underwent routine screening mammogram and was noted to have a clustering of microcalcifications at 0800 posterior in the left breast.  Stereotactic biopsy 5/1/2025 demonstrated DICS intermediate grade ER 0 MA 0.   She denies feeling any lumps, skin changes, nipple changes and or drainage, unexplained weight loss or bone pain.  She had menarche at 11, only pregnancy at 18, and took HRT x 19 years.   She denies family hx breast, ovarian, prostate, pancreatic, colon cancer.         Past Medical History        Past Medical History:   Diagnosis Date    Arrhythmia      Essential hypertension      Hypertension      MS (multiple sclerosis) (HCC)      Musculoskeletal disorder      Other ill-defined conditions(799.89)       multiple sclerosis    Unspecified adverse effect of anesthesia       given versed and lasted 24 hours         Past Surgical History         Past Surgical History:   Procedure Laterality Date    COLONOSCOPY N/A 6/4/2020     COLONOSCOPY performed by Elton Dorsey MD at [facility] ENDOSCOPY    COLONOSCOPY   4/18/2011          COLONOSCOPY,DIAGNOSTIC   6/4/2020          DILATION AND CURETTAGE OF UTERUS        HERNIA REPAIR         left    HYSTERECTOMY (CERVIX STATUS UNKNOWN)   2011    OTHER SURGICAL HISTORY         tendonitis repair    OTHER SURGICAL HISTORY         cardiac ablation    OTHER SURGICAL HISTORY         bilateral cataracts surgery    OTHER SURGICAL HISTORY         left eye muscle surgery    MA UNLISTED PROCEDURE CARDIAC SURGERY         coronary ablations    TOTAL COLECTOMY   2006     villous adenoma         Family History         Family History   Problem Relation Age of Onset    Hypertension Mother      Heart Disease Father      Elevated Lipids Mother      Diabetes Sister      Psychiatric/Behavioral:  Negative for behavioral problems. The patient is not nervous/anxious.             /80 (BP Site: Left Upper Arm, Patient Position: Sitting, BP Cuff Size: Medium Adult)   Pulse 72   Temp 97.5 °F (36.4 °C) (Oral)   Resp 18   Ht 1.651 m (5' 5\")   Wt 78.5 kg (173 lb)   SpO2 98%   BMI 28.79 kg/m²      Objective:   Physical Exam  Vitals and nursing note reviewed. Exam conducted with a chaperone present.   Constitutional:       General: She is not in acute distress.     Appearance: Normal appearance. She is well-developed. She is not ill-appearing or diaphoretic.   HENT:      Head: Normocephalic and atraumatic.   Eyes:      General: No scleral icterus.     Extraocular Movements: Extraocular movements intact.      Conjunctiva/sclera: Conjunctivae normal.      Pupils: Pupils are equal, round, and reactive to light.   Neck:      Thyroid: No thyroid mass or thyromegaly.      Trachea: Trachea and phonation normal. No tracheal deviation.   Cardiovascular:      Rate and Rhythm: Normal rate and regular rhythm.      Heart sounds: Normal heart sounds. No murmur heard.     No friction rub. No gallop.   Pulmonary:      Effort: Pulmonary effort is normal. No respiratory distress.      Breath sounds: Normal breath sounds. No stridor. No wheezing or rales.   Chest:   Breasts:     Breasts are symmetrical.      Right: No inverted nipple, mass, nipple discharge, skin change or tenderness.      Left: No inverted nipple, mass, nipple discharge, skin change or tenderness.        Abdominal:      General: Bowel sounds are normal. There is no distension.      Palpations: There is no mass.      Tenderness: There is no abdominal tenderness. There is no guarding or rebound.      Hernia: No hernia is present.   Musculoskeletal:         General: No swelling or tenderness. Normal range of motion.      Cervical back: Normal range of motion and neck supple.   Lymphadenopathy:      Cervical: No cervical adenopathy.

## 2025-07-10 NOTE — ANESTHESIA PRE PROCEDURE
tobacco: Never   Substance Use Topics   • Alcohol use: Yes     Comment: occassional                                Counseling given: Not Answered      Vital Signs (Current):   Vitals:    06/27/25 1456   Weight: 77.1 kg (170 lb)   Height: 1.651 m (5' 5\")                                              BP Readings from Last 3 Encounters:   05/12/25 136/80   12/12/22 120/68   05/17/21 119/69       NPO Status:                                                                                 BMI:   Wt Readings from Last 3 Encounters:   06/27/25 77.1 kg (170 lb)   05/12/25 78.5 kg (173 lb)   12/12/22 76.5 kg (168 lb 9.6 oz)     Body mass index is 28.29 kg/m².    CBC:   Lab Results   Component Value Date/Time    WBC 11.5 12/02/2022 10:01 AM    RBC 4.87 12/02/2022 10:01 AM    HGB 12.3 12/02/2022 10:01 AM    HCT 37.9 12/02/2022 10:01 AM    MCV 77.8 12/02/2022 10:01 AM    RDW 16.1 12/02/2022 10:01 AM     12/02/2022 10:01 AM       CMP:   Lab Results   Component Value Date/Time     12/13/2022 10:01 AM    K 3.5 12/13/2022 10:01 AM     12/13/2022 10:01 AM    CO2 33 12/13/2022 10:01 AM    BUN 15 12/13/2022 10:01 AM    CREATININE 0.77 12/13/2022 10:01 AM    AGRATIO 0.9 12/02/2022 10:01 AM    LABGLOM >60 12/13/2022 10:01 AM    GLUCOSE 95 12/13/2022 10:01 AM    CALCIUM 9.5 12/13/2022 10:01 AM    BILITOT 0.6 12/02/2022 10:01 AM    ALKPHOS 72 12/02/2022 10:01 AM    AST 15 12/02/2022 10:01 AM    ALT 23 12/02/2022 10:01 AM       POC Tests: No results for input(s): \"POCGLU\", \"POCNA\", \"POCK\", \"POCCL\", \"POCBUN\", \"POCHEMO\", \"POCHCT\" in the last 72 hours.    Coags: No results found for: \"PROTIME\", \"INR\", \"APTT\"    HCG (If Applicable): No results found for: \"PREGTESTUR\", \"PREGSERUM\", \"HCG\", \"HCGQUANT\"     ABGs: No results found for: \"PHART\", \"PO2ART\", \"CDX0YBD\", \"OWT4OCQ\", \"BEART\", \"Z4PZCEYH\"     Type & Screen (If Applicable):  No results found for: \"ABORH\", \"LABANTI\"    Drug/Infectious Status (If Applicable):  Lab Results

## 2025-07-10 NOTE — DISCHARGE INSTRUCTIONS
Discharge Instructions:  Lumpectomy/Partial Mastectomy  Dr. Paulson    Call for appointment for follow up in 1 week [phone]    Activity:    Walk regularly.  You may resume driving in 24 hours unless still requiring narcotics for pain.  It is ok to use the arm on side of surgery but do not over do it.  Perform range of motion exercises to arm (less than 90 degrees if drain in place) three times daily.    Work:    You may return to work in 4 to 6 days to light activity. No lifting more than 10 pounds for three weeks.    Diet:    You may resume normal diet after 24 hours.  Anesthesia and narcotics may cause nausea and vomiting.  If persistent please call the office.    Wound Care:    You have a special dressing called Dermabond.  It is okay to shower and let the water run over the incision but do not scrub the area or soak in a tub.  If you have a drain cover the area prior to showering with clear plastic and tape.  IIf you have a small amount of drainage you may place a dry bandage over the wound and change it daily.  If you experience a lot of drainage, develop redness around the wound, or a fever over 101 F occurs please call the office.  Wear bra to support breast even at night to support the breast during the first week after surgery.    Use ice over breast for 20 minutes every 1-2 hours to reduce swelling, bleeding risk and pain    Medications:    Resume home medications as indicated on the Medical Reconciliation form.     Aspirin, Coumadin, and Plavix can be restarted on post operative day 2 if you were taking them preoperatively.      Pain medications:  Non steroidal antiinflammatories seem to work best for post surgical pain.  Try these first as prescribed.  A narcotic prescription will also be given for breakthrough pain.    Over the counter stool softeners and laxatives may be used if needed.    Do not hesitate to call with questions or concerns.    There is a lifetime risk of lymphedema if nodes were  removed, so no blood pressures or needle sticks should be performed to arm on side of procedure.      DISCHARGE SUMMARY from Nurse    PATIENT INSTRUCTIONS:    After general anesthesia or intravenous sedation, for 24 hours or while taking prescription narcotics:    Have someone responsible help you with your care  Limit your activities  Do not drive and operate hazardous machinery  Do not make important personal, legal or business decisions  Do not drink alcoholic beverages  If you have not urinated within 8 hours after discharge, please contact your surgeon on call  Resume your medications unless otherwise instructed    From general anesthesia, intravenous sedation, or while taking prescription narcotics, you may experience:    Drowsiness, dizziness, sleepiness, or confusion  Difficulty remembering or delayed reaction times  Difficulty with your balance, especially while walking, move slowly and carefully, do not make sudden position changes  Difficulty focusing or blurred vision    You may not be aware of slight changes in your behavior and/or your reaction time because of the medication used during and after your procedure.    Report the following to your surgeon:  Excessive pain, swelling, redness or odor of or around the surgical area  Temperature over 100.5  Nausea and vomiting lasting longer than 4 hours or if unable to take medications  Any signs of decreased circulation or nerve impairment to extremity: change in color, persistent numbness, tingling, coldness or increase pain  Any questions or concerns         IF YOU REPORT TO AN EMERGENCY ROOM, DOCTOR'S OFFICE OR HOSPITAL WITHIN 24 HOURS AFTER YOUR PROCEDURE, BRING THIS SHEET AND YOUR AFTER VISIT SUMMARY WITH YOU AND GIVE IT TO THE PHYSICIAN OR NURSE ATTENDING YOU.    These are general instructions for a healthy lifestyle (if applicable):    No smoking/ No tobacco products/ Avoid exposure to secondhand smoke  Surgeon General's Warning:  Quitting smoking now

## 2025-07-10 NOTE — OP NOTE
[facility]              [address] [place]  [address]                            OPERATIVE REPORT      PATIENT NAME: JAZ RAIN               : 1955  MED REC NO: [id number]                       ROOM: OR  ACCOUNT NO: [id number]                       ADMIT DATE: 07/10/2025  PROVIDER: Gio Ramos MD    DATE OF SERVICE:  07/10/2025    PREOPERATIVE DIAGNOSES:  Left breast carcinoma.    POSTOPERATIVE DIAGNOSES:  Left breast ductal carcinoma in situ.    PROCEDURES PERFORMED:  Left breast lumpectomy with needle localization.    SURGEON:  Gio Ramos MD    ASSISTANT:  Eddi Graves SA    ANESTHESIA:  General.    ESTIMATED BLOOD LOSS:  Minimal.    SPECIMENS REMOVED:  Left breast tissue at 9 o'clock, labeled with two long sutures superiorly and two short sutures medially.    INTRAOPERATIVE FINDINGS:       1. No infection at the time of the surgery.     2. Clip and calcifications in the specimen.     3. [place] lymph node biopsy not indicated as it was only ductal carcinoma in situ.     COMPLICATIONS:  None.    IMPLANTS:  None.    INDICATIONS:  Brief History:  The patient is a pleasant 69-year-old female with left breast carcinoma, DCIS.  Options were discussed, elected to undergo breast conservation therapy after workup, and she now presents for that.  She understands the risks and benefits, and wished to proceed.    DESCRIPTION OF PROCEDURE:  The patient was taken to the operating room and placed on the operating room table in the supine position, underwent general anesthesia, and the left breast was prepped and draped in the usual sterile fashion.  Earlier in the day, she had a wire localization placed in the medial aspect of the breast, where the clip was present.  After appropriate time-out and antibiotics were given, we anesthetized the skin and the subcutaneous tissue in the periareolar region from 6 o'clock to 12 o'clock and in the deeper

## 2025-07-11 NOTE — ANESTHESIA POSTPROCEDURE EVALUATION
Department of Anesthesiology  Postprocedure Note    Patient: Carlito Alonzo  MRN: [id number]  YOB: 1955  Date of evaluation: 7/11/2025    Procedure Summary       Date: 07/10/25 Room / Location: [facility] MAIN OR M5 / [facility] MAIN OR    Anesthesia Start: 1057 Anesthesia Stop: 1201    Procedure: LEFT BREAST LUMPECTOMY WITH NEEDLE LOCALIZATION (Left: Breast) Diagnosis:       Ductal carcinoma in situ (DCIS) of left breast      (Ductal carcinoma in situ (DCIS) of left breast [D05.12])    Providers: Gio Paulson MD Responsible Provider: Franko Emerson MD    Anesthesia Type: General ASA Status: 2            Anesthesia Type: General    Carey Phase I: Carey Score: 10    Carey Phase II: Carey Score: 10    Anesthesia Post Evaluation    Patient location during evaluation: PACU  Patient participation: complete - patient participated  Level of consciousness: awake and alert  Airway patency: patent  Nausea & Vomiting: no nausea and no vomiting  Cardiovascular status: hemodynamically stable  Respiratory status: acceptable  Hydration status: stable  Pain management: satisfactory to patient        No notable events documented.

## 2025-07-21 ENCOUNTER — OFFICE VISIT (OUTPATIENT)
Age: 70
End: 2025-07-21

## 2025-07-21 VITALS
BODY MASS INDEX: 29.19 KG/M2 | WEIGHT: 175.2 LBS | HEIGHT: 65 IN | OXYGEN SATURATION: 97 % | SYSTOLIC BLOOD PRESSURE: 119 MMHG | TEMPERATURE: 97.3 F | RESPIRATION RATE: 12 BRPM | HEART RATE: 70 BPM | DIASTOLIC BLOOD PRESSURE: 71 MMHG

## 2025-07-21 DIAGNOSIS — D05.12 DUCTAL CARCINOMA IN SITU (DCIS) OF LEFT BREAST: Primary | ICD-10-CM

## 2025-07-21 PROCEDURE — 99024 POSTOP FOLLOW-UP VISIT: CPT | Performed by: SURGERY

## 2025-07-21 RX ORDER — POTASSIUM CHLORIDE 1500 MG/1
20 TABLET, EXTENDED RELEASE ORAL DAILY
COMMUNITY
Start: 2025-07-07 | End: 2025-08-06

## 2025-07-21 RX ORDER — TERIFLUNOMIDE 14 MG/1
14 TABLET, FILM COATED ORAL DAILY
COMMUNITY
Start: 2025-07-01 | End: 2026-07-01

## 2025-07-21 ASSESSMENT — PATIENT HEALTH QUESTIONNAIRE - PHQ9
2. FEELING DOWN, DEPRESSED OR HOPELESS: NOT AT ALL
SUM OF ALL RESPONSES TO PHQ QUESTIONS 1-9: 0
1. LITTLE INTEREST OR PLEASURE IN DOING THINGS: NOT AT ALL
SUM OF ALL RESPONSES TO PHQ QUESTIONS 1-9: 0

## 2025-07-21 NOTE — PROGRESS NOTES
Identified pt with two pt identifiers (name and ). Reviewed chart in preparation for visit and have obtained necessary documentation.    Carlito Alonzo is a 69 y.o. female Post-Op Check (S/p Left breast lumpectomy with needle localization. On 7/10/25)  .    Vitals:    25 1006 25 1018   BP: (!) 145/73 119/71   BP Site: Left Upper Arm Right Upper Arm   Patient Position: Sitting Sitting   Pulse: 70    Resp: 12    Temp: 97.3 °F (36.3 °C)    TempSrc: Oral    SpO2: 97%    Weight: 79.5 kg (175 lb 3.2 oz)    Height: 1.651 m (5' 5\")           1. Have you been to the ER, urgent care clinic since your last visit?  Hospitalized since your last visit?  no     2. Have you seen or consulted any other health care providers outside of the [facility] since your last visit?  Include any pap smears or colon screening.  no

## 2025-07-21 NOTE — PROGRESS NOTES
Surgery  Follow up    Procedure: Left breast lumpectomy with needle localization   OR date:  7/10/2025  Path:    FINAL PATHOLOGIC DIAGNOSIS     Breast, left, 9:00, lumpectomy:        Ductal carcinoma in situ, intermediate nuclear grade, solid and   cribriform types.        DCIS is 1.5 mm from closest (lateral) margin.        See synoptic below.     DCIS OF THE BREAST: Resection          SPECIMEN       Procedure:  Excision (less than total mastectomy)   Specimen Laterality:  Left   TUMOR       Histologic Type:  Ductal carcinoma in situ   Size (Extent) of DCIS:  Estimated size (extent) of DCIS is at least   (Millimeters) - 10 mm        Number of Blocks with DCIS:  4   Number of Blocks Examined:  9        Architectural Patterns:  Cribriform, Solid   Nuclear Grade:  Grade II (intermediate)   Necrosis:  Present, focal (small foci or single cell necrosis)   Microcalcifications:  Present in DCIS and nonneoplastic tissue     MARGINS       Margin Status:  All margins negative for DCIS        Distance from DCIS to Closest Margin:  1.5 mm   Closest Margin(s) to DCIS:  Lateral     REGIONAL LYMPH NODES        Regional Lymph Node Status:  Not applicable (no regional lymph nodes   submitted or found)     PATHOLOGIC STAGE CLASSIFICATION (pTNM, AJCC 8th Edition)   Reporting of pT, pN, and (when applicable) pM categories is based on   information available to the pathologist at the time the report is issued.   As per the AJCC (Chapter 1, 8th Ed.) it is the managing physician's   responsibility to establish the final pathologic stage based upon all   pertinent information, including but potentially not limited to this   pathology report.        pT Category:  pTis (DCIS)   pN Category:  pN not assigned (no nodes submitted or found)     Breast Biomarker Testing Performed on Previous Biopsy:         Estrogen Receptor (ER) Status:  Negative   Progesterone Receptor (PgR) Status:  Negative   Testing Performed on Case Number:  Outside

## 2025-07-30 NOTE — TELEPHONE ENCOUNTER
[facility]  Breast Navigator Encounter    Name:    Carlito Alonzo  Age:    69 y.o.  Diagnosis: dcis    Narrative: I spoke with Carlito today and she was confused and saw a mammo was ordered on her chart. I told her this is not a mammogram, this is to place a biopsy clip/marker for Dr. Paulson to be able to see where he is going when he does her surgery. The hospital was calling her at the same time so i told her to talk with them first and then call me back.     Carlito called me back and when she comes Monday for her appointment, she will stop by the OR to  printed instructions for surgery. We discussed instructions, arrival time and expectations for Monday. She has no other questions or needs at this time.             Andreia VELAZQUEZ-RN  Breast Cancer Navigator   [address]  [address]  [email or web address]  Good Help to Those in Need®    
No

## 2025-08-27 ENCOUNTER — OFFICE VISIT (OUTPATIENT)
Age: 70
End: 2025-08-27

## 2025-08-27 VITALS
TEMPERATURE: 97.9 F | BODY MASS INDEX: 28.49 KG/M2 | HEART RATE: 76 BPM | HEIGHT: 65 IN | RESPIRATION RATE: 16 BRPM | OXYGEN SATURATION: 98 % | SYSTOLIC BLOOD PRESSURE: 140 MMHG | WEIGHT: 171 LBS | DIASTOLIC BLOOD PRESSURE: 80 MMHG

## 2025-08-27 DIAGNOSIS — Z48.89 ENCOUNTER FOR POSTOPERATIVE CARE: Primary | ICD-10-CM

## 2025-08-27 PROCEDURE — 99024 POSTOP FOLLOW-UP VISIT: CPT | Performed by: SURGERY

## 2025-08-27 ASSESSMENT — PATIENT HEALTH QUESTIONNAIRE - PHQ9
SUM OF ALL RESPONSES TO PHQ QUESTIONS 1-9: 0
2. FEELING DOWN, DEPRESSED OR HOPELESS: NOT AT ALL
1. LITTLE INTEREST OR PLEASURE IN DOING THINGS: NOT AT ALL
SUM OF ALL RESPONSES TO PHQ QUESTIONS 1-9: 0

## (undated) DEVICE — CATH IV AUTOGRD BC PNK 20GA 25 -- INSYTE

## (undated) DEVICE — BLADE ES ELASTOMERIC COAT INSUL DURABLE BEND UPTO 90DEG

## (undated) DEVICE — 1200 GUARD II KIT W/5MM TUBE W/O VAC TUBE: Brand: GUARDIAN

## (undated) DEVICE — CONTAINER,SPEC,PNEUM TUBE,3OZ,STRL PATH: Brand: MEDLINE

## (undated) DEVICE — SUTURE VICRYL + SZ 3-0 L27IN ABSRB UD L26MM SH 1/2 CIR VCP416H

## (undated) DEVICE — TOWEL 4 PLY TISS 19X30 SUE WHT

## (undated) DEVICE — GOWN,SIRUS,NONRNF,SETINSLV,2XL,18/CS: Brand: MEDLINE

## (undated) DEVICE — NEEDLE SPNL 20GA L3.5IN YEL HUB S STL REG WALL FIT STYL

## (undated) DEVICE — PENCIL SMK EVAC ALL IN 1 DSGN ENH VISIBILITY IMPROVED AIR

## (undated) DEVICE — ADHESIVE SKIN CLOSURE WND 8.661X1.5 IN 22 CM LIQUIBAND SECUR

## (undated) DEVICE — NEEDLE HYPO 18GA L1.5IN PNK S STL HUB POLYPR SHLD REG BVL

## (undated) DEVICE — GLOVE ORANGE PI 7 1/2   MSG9075

## (undated) DEVICE — SUTURE PERMA-HAND SZ 2-0 L30IN NONABSORBABLE BLK L26MM SH K833H

## (undated) DEVICE — GAUZE,SPONGE,4"X4",12PLY,STRL,LF,10/TRAY: Brand: MEDLINE

## (undated) DEVICE — Z DISCONTINUED PER MEDLINE LINE GAS SAMPLING O2/CO2 LNG AD 13 FT NSL W/ TBNG FILTERLINE

## (undated) DEVICE — SET ADMIN 16ML TBNG L100IN 2 Y INJ SITE IV PIGGY BK DISP

## (undated) DEVICE — Device

## (undated) DEVICE — CHEST/BREAST-MRMCASU: Brand: MEDLINE INDUSTRIES, INC.

## (undated) DEVICE — BLADE SURG NO15 C STL REUSE HNDL CONVENTIONAL DISP RIB BK

## (undated) DEVICE — 3M™ TEGADERM™ TRANSPARENT FILM DRESSING FRAME STYLE, 1626W, 4 IN X 4-3/4 IN (10 CM X 12 CM), 50/CT 4CT/CASE: Brand: 3M™ TEGADERM™

## (undated) DEVICE — SYR 3ML LL TIP 1/10ML GRAD --

## (undated) DEVICE — SUTURE ETHILON SZ 2-0 L30IN NONABSORBABLE BLK L36MM PSLX 3/8 1697H

## (undated) DEVICE — TRAP FLUID BUFFALO FLTR

## (undated) DEVICE — SUTURE VICRYL + SZ 4-0 L27IN ABSRB UD PS-2 3/8 CIR REV CUT VCP426H

## (undated) DEVICE — BASIN EMSIS 16OZ GRAPHITE PLAS KID SHP MOLD GRAD FOR ORAL

## (undated) DEVICE — KIT,1200CC CANISTER,3/16"X6' TUBING: Brand: MEDLINE INDUSTRIES, INC.

## (undated) DEVICE — SYR 10ML LUER LOK 1/5ML GRAD --

## (undated) DEVICE — DRAIN SURG 19FR L025IN DIA63MM SIL CHN RND FULL FLUT CLS

## (undated) DEVICE — NEONATAL-ADULT SPO2 SENSOR: Brand: NELLCOR

## (undated) DEVICE — ELECTRODE,RADIOTRANSLUCENT,FOAM,5PK: Brand: MEDLINE

## (undated) DEVICE — TOWEL,OR,DSP,ST,BLUE,STD,4/PK,20PK/CS: Brand: MEDLINE

## (undated) DEVICE — SOLIDIFIER MEDC 1200ML -- CONVERT TO 356117

## (undated) DEVICE — SOLUTION IRRIG 1000ML 09% SOD CHL USP PIC PLAS CONTAINER